# Patient Record
Sex: FEMALE | Race: BLACK OR AFRICAN AMERICAN | NOT HISPANIC OR LATINO | Employment: OTHER | URBAN - METROPOLITAN AREA
[De-identification: names, ages, dates, MRNs, and addresses within clinical notes are randomized per-mention and may not be internally consistent; named-entity substitution may affect disease eponyms.]

---

## 2023-05-10 ENCOUNTER — NURSING HOME VISIT (OUTPATIENT)
Dept: POST ACUTE CARE | Facility: EXTERNAL LOCATION | Age: 88
End: 2023-05-10
Payer: MEDICARE

## 2023-05-10 DIAGNOSIS — R53.1 WEAKNESS: ICD-10-CM

## 2023-05-10 DIAGNOSIS — I10 PRIMARY HYPERTENSION: ICD-10-CM

## 2023-05-10 DIAGNOSIS — G20.A1 PARKINSON'S DISEASE (MULTI): Primary | ICD-10-CM

## 2023-05-10 DIAGNOSIS — G31.83 LEWY BODY DEMENTIA WITH OTHER BEHAVIORAL DISTURBANCE, UNSPECIFIED DEMENTIA SEVERITY (MULTI): ICD-10-CM

## 2023-05-10 DIAGNOSIS — E78.5 HYPERLIPIDEMIA, UNSPECIFIED HYPERLIPIDEMIA TYPE: ICD-10-CM

## 2023-05-10 DIAGNOSIS — F02.818 LEWY BODY DEMENTIA WITH OTHER BEHAVIORAL DISTURBANCE, UNSPECIFIED DEMENTIA SEVERITY (MULTI): ICD-10-CM

## 2023-05-10 PROCEDURE — 99305 1ST NF CARE MODERATE MDM 35: CPT | Performed by: INTERNAL MEDICINE

## 2023-05-10 NOTE — LETTER
Patient: Ofelia Cabral  : 12/10/1934    Encounter Date: 05/10/2023    Biological/Physical:  This is a 88-year-old female who is seen today. She is recently transferred to [facility] for long-term care. Her medical history significant for Parkinson's disease, Lewy body dementia, essential tremors, hyperlipidemia, urinary incontinence, osteoarthritis of the knees, history of falls and weakness.  She lives at [facility] and was noted to have slow and steady decline in her functional level. She was also noted to have behaviors due to her dementia.  Today she is sitting in her room. She appears pleasant. She denies any pain. No health concerns per staff.  Review of systems  General-confusion present  Chest-No pain or palpitations  Respiratory-no cough  Abdomen-no pain, no nausea no vomiting no diarrhea  Extremities-no swelling  Psych-confusion present  Neurology-tremors present  Vital signs-blood pressure 147/52  Temperature 97.9  Pulse-100  Weight-117 pounds  Physical exam-General-no acute distress, pleasant, confused  Neck-no mass or JVD  Chest- CTA B/L  CVS- S1S2 RRR  Abdomen-soft, NT,BS+  Extremities-no edema  Neurology-no gross motor deficits  Hand tremors present  Assessment and plan-  Dementia-Doub early dementia  Progressive decline in functioning  Continue with Seroquel for behaviors  Parkinson's disease-  Hypertension-stable  Continue with propranolol  Hyperlipidemia -continue atorvastatin   Weakness-PT OT and speech therapy evaluation  ADL's and CCL's have been reviewed and are current as per care plan.      Electronically Signed By: Monie Clark MD   23 10:55 AM

## 2023-05-12 NOTE — PROGRESS NOTES
Biological/Physical:  This is a 88-year-old female who is seen today. She is recently transferred to [facility] for long-term care. Her medical history significant for Parkinson's disease, Lewy body dementia, essential tremors, hyperlipidemia, urinary incontinence, osteoarthritis of the knees, history of falls and weakness.  She lives at [facility] and was noted to have slow and steady decline in her functional level. She was also noted to have behaviors due to her dementia.  Today she is sitting in her room. She appears pleasant. She denies any pain. No health concerns per staff.  Review of systems  General-confusion present  Chest-No pain or palpitations  Respiratory-no cough  Abdomen-no pain, no nausea no vomiting no diarrhea  Extremities-no swelling  Psych-confusion present  Neurology-tremors present  Vital signs-blood pressure 147/52  Temperature 97.9  Pulse-100  Weight-117 pounds  Physical exam-General-no acute distress, pleasant, confused  Neck-no mass or JVD  Chest- CTA B/L  CVS- S1S2 RRR  Abdomen-soft, NT,BS+  Extremities-no edema  Neurology-no gross motor deficits  Hand tremors present  Assessment and plan-  Dementia-Doub early dementia  Progressive decline in functioning  Continue with Seroquel for behaviors  Parkinson's disease-  Hypertension-stable  Continue with propranolol  Hyperlipidemia -continue atorvastatin   Weakness-PT OT and speech therapy evaluation  ADL's and CCL's have been reviewed and are current as per care plan.

## 2023-06-14 ENCOUNTER — NURSING HOME VISIT (OUTPATIENT)
Dept: POST ACUTE CARE | Facility: EXTERNAL LOCATION | Age: 88
End: 2023-06-14
Payer: MEDICARE

## 2023-06-14 DIAGNOSIS — I10 PRIMARY HYPERTENSION: ICD-10-CM

## 2023-06-14 DIAGNOSIS — G20.A1 PARKINSON'S DISEASE (MULTI): ICD-10-CM

## 2023-06-14 DIAGNOSIS — F02.80 DEMENTIA ASSOCIATED WITH OTHER UNDERLYING DISEASE, UNSPECIFIED DEMENTIA SEVERITY, UNSPECIFIED WHETHER BEHAVIORAL, PSYCHOTIC, OR MOOD DISTURBANCE OR ANXIETY (MULTI): Primary | ICD-10-CM

## 2023-06-14 PROCEDURE — 99308 SBSQ NF CARE LOW MDM 20: CPT | Performed by: INTERNAL MEDICINE

## 2023-06-14 NOTE — LETTER
Patient: Ofelia Cabral  : 12/10/1934    Encounter Date: 2023    Subjective- resident seen for follow up. she is sitting in her room and feels well. No aches or pains.  Review of systems  General-confusion present  Chest-No pain or palpitations  Respiratory-nocough  Abdomen-no pain, no nausea no vomiting no diarrhea  Extremities-no swelling  Psych-confusion present  Neurology-tremors present  Vital signs-blood pressure 147/52  Temperature 97.2  Pulse-100  Weight-121 pounds    Physical exam-  General-no acute distress, pleasant, confused  Neck-no mass or JVD  Chest- CTA B/L  CVS- S1S2 RRR  Abdomen-soft, NT,BS+  Extremities-no edema  Neurology-no gross motor deficits  Hand tremors present    Assessment and plan-  Dementia-Early dementia  Progressive decline in functioning  Continue with Seroquel for behaviors  Parkinson's disease-  Hypertension-stable  Continue with propranolol      Electronically Signed By: Monie Clark MD   23 10:26 AM

## 2023-06-16 NOTE — PROGRESS NOTES
Subjective- resident seen for follow up. she is sitting in her room and feels well. No aches or pains.  Review of systems  General-confusion present  Chest-No pain or palpitations  Respiratory-nocough  Abdomen-no pain, no nausea no vomiting no diarrhea  Extremities-no swelling  Psych-confusion present  Neurology-tremors present  Vital signs-blood pressure 147/52  Temperature 97.2  Pulse-100  Weight-121 pounds    Physical exam-  General-no acute distress, pleasant, confused  Neck-no mass or JVD  Chest- CTA B/L  CVS- S1S2 RRR  Abdomen-soft, NT,BS+  Extremities-no edema  Neurology-no gross motor deficits  Hand tremors present    Assessment and plan-  Dementia-Early dementia  Progressive decline in functioning  Continue with Seroquel for behaviors  Parkinson's disease-  Hypertension-stable  Continue with propranolol

## 2023-07-12 ENCOUNTER — NURSING HOME VISIT (OUTPATIENT)
Dept: POST ACUTE CARE | Facility: EXTERNAL LOCATION | Age: 88
End: 2023-07-12
Payer: MEDICARE

## 2023-07-12 DIAGNOSIS — G20.A1 PARKINSON'S DISEASE (MULTI): ICD-10-CM

## 2023-07-12 DIAGNOSIS — R53.1 WEAKNESS: ICD-10-CM

## 2023-07-12 DIAGNOSIS — I10 PRIMARY HYPERTENSION: ICD-10-CM

## 2023-07-12 DIAGNOSIS — F02.80 DEMENTIA ASSOCIATED WITH OTHER UNDERLYING DISEASE, UNSPECIFIED DEMENTIA SEVERITY, UNSPECIFIED WHETHER BEHAVIORAL, PSYCHOTIC, OR MOOD DISTURBANCE OR ANXIETY (MULTI): Primary | ICD-10-CM

## 2023-07-12 PROCEDURE — 99307 SBSQ NF CARE SF MDM 10: CPT | Performed by: INTERNAL MEDICINE

## 2023-07-12 NOTE — LETTER
Patient: Ofelia Cabral  : 12/10/1934    Encounter Date: 2023    subjective- resident seen for follow up. she is lying in her room and feels well. No aches or pains.  Review of systems  General-confusion present  Chest-No pain or palpitations  Respiratory-nocough  Abdomen-no pain, no nausea no vomiting no diarrhea  Extremities-no swelling  Psych-confusion present  Neurology-tremors present  Vital signs-blood pressure 147/52  Temperature 98  Pulse-100  Weight-119 pounds  Physical exam-  General-no acute distress, pleasant, confused  Neck-no mass or JVD  Chest- CTA B/L  CVS- S1S2 RRR  Abdomen-soft, NT,BS+  Extremities-no edema  Neurology-no gross motor deficits  Hand tremors present  Assessment and plan-  Dementia-Early dementia  c/w supportive care  Continue with Seroquel for behaviors  Parkinson's disease-  Hypertension-stable  Continue with propranolol  Hyperlipidemia we will continue atorvastatin Weakness-c/w PT OT      Electronically Signed By: Monie Clark MD   23  9:30 PM

## 2023-07-13 NOTE — PROGRESS NOTES
subjective- resident seen for follow up. she is lying in her room and feels well. No aches or pains.  Review of systems  General-confusion present  Chest-No pain or palpitations  Respiratory-nocough  Abdomen-no pain, no nausea no vomiting no diarrhea  Extremities-no swelling  Psych-confusion present  Neurology-tremors present  Vital signs-blood pressure 147/52  Temperature 98  Pulse-100  Weight-119 pounds  Physical exam-  General-no acute distress, pleasant, confused  Neck-no mass or JVD  Chest- CTA B/L  CVS- S1S2 RRR  Abdomen-soft, NT,BS+  Extremities-no edema  Neurology-no gross motor deficits  Hand tremors present  Assessment and plan-  Dementia-Early dementia  c/w supportive care  Continue with Seroquel for behaviors  Parkinson's disease-  Hypertension-stable  Continue with propranolol  Hyperlipidemia we will continue atorvastatin Weakness-c/w PT OT

## 2023-08-02 ENCOUNTER — NURSING HOME VISIT (OUTPATIENT)
Dept: POST ACUTE CARE | Facility: EXTERNAL LOCATION | Age: 88
End: 2023-08-02
Payer: MEDICARE

## 2023-08-02 DIAGNOSIS — F02.80 DEMENTIA ASSOCIATED WITH OTHER UNDERLYING DISEASE, UNSPECIFIED DEMENTIA SEVERITY, UNSPECIFIED WHETHER BEHAVIORAL, PSYCHOTIC, OR MOOD DISTURBANCE OR ANXIETY (MULTI): Primary | ICD-10-CM

## 2023-08-02 DIAGNOSIS — E78.49 OTHER HYPERLIPIDEMIA: ICD-10-CM

## 2023-08-02 DIAGNOSIS — I10 PRIMARY HYPERTENSION: ICD-10-CM

## 2023-08-02 DIAGNOSIS — G20.A1 PARKINSON'S DISEASE (MULTI): ICD-10-CM

## 2023-08-02 PROBLEM — F03.90 DEMENTIA (MULTI): Status: ACTIVE | Noted: 2023-08-02

## 2023-08-02 PROCEDURE — 99308 SBSQ NF CARE LOW MDM 20: CPT | Performed by: INTERNAL MEDICINE

## 2023-08-02 NOTE — LETTER
Patient: Ofelia Cabral  : 12/10/1934    Encounter Date: 2023    subjective- resident seen for follow up. She had a fall few days back, no injury. B/L hand tremors present.  Review of systems  General-confusion present  Chest-No pain or palpitations  Respiratory-nocough  Abdomen-no pain, no nausea no vomiting no diarrhea  Extremities-no swelling  Psych-confusion present  Neurology-tremors present  Vital signs-blood pressure 147/52  Temperature 98  Pulse-100  Weight-115 pounds  Physical exam-  General-no acute distress, pleasant, confused  Neck-no mass or JVD  Chest- CTA B/L  CVS- S1S2 RRR  Abdomen-soft, NT,BS+  Extremities-no edema  Neurology-no gross motor deficits  Hand tremors present    Assessment and plan-  Dementia-Early dementia  c/w supportive care  Continue with Seroquel for behaviors  Parkinson's disease- neurology appt  Hypertension-stable  Continue with propranolol  Hyperlipidemia we will continue atorvastatin Weakness-c/w PT OT      Electronically Signed By: Monie Clark MD   23  8:26 PM

## 2023-08-03 NOTE — PROGRESS NOTES
subjective- resident seen for follow up. She had a fall few days back, no injury. B/L hand tremors present.  Review of systems  General-confusion present  Chest-No pain or palpitations  Respiratory-nocough  Abdomen-no pain, no nausea no vomiting no diarrhea  Extremities-no swelling  Psych-confusion present  Neurology-tremors present  Vital signs-blood pressure 147/52  Temperature 98  Pulse-100  Weight-115 pounds  Physical exam-  General-no acute distress, pleasant, confused  Neck-no mass or JVD  Chest- CTA B/L  CVS- S1S2 RRR  Abdomen-soft, NT,BS+  Extremities-no edema  Neurology-no gross motor deficits  Hand tremors present    Assessment and plan-  Dementia-Early dementia  c/w supportive care  Continue with Seroquel for behaviors  Parkinson's disease- neurology appt  Hypertension-stable  Continue with propranolol  Hyperlipidemia we will continue atorvastatin Weakness-c/w PT OT

## 2023-08-30 ENCOUNTER — NURSING HOME VISIT (OUTPATIENT)
Dept: POST ACUTE CARE | Facility: EXTERNAL LOCATION | Age: 88
End: 2023-08-30
Payer: MEDICARE

## 2023-08-30 DIAGNOSIS — I10 PRIMARY HYPERTENSION: Primary | ICD-10-CM

## 2023-08-30 DIAGNOSIS — G20.A1 PARKINSON'S DISEASE (MULTI): ICD-10-CM

## 2023-08-30 DIAGNOSIS — E78.49 OTHER HYPERLIPIDEMIA: ICD-10-CM

## 2023-08-30 DIAGNOSIS — R25.1 TREMOR OF BOTH HANDS: ICD-10-CM

## 2023-08-30 DIAGNOSIS — F01.A18 MILD VASCULAR DEMENTIA WITH OTHER BEHAVIORAL DISTURBANCE (MULTI): ICD-10-CM

## 2023-08-30 DIAGNOSIS — R53.1 WEAKNESS: ICD-10-CM

## 2023-08-30 PROCEDURE — 99308 SBSQ NF CARE LOW MDM 20: CPT | Performed by: INTERNAL MEDICINE

## 2023-08-30 NOTE — LETTER
Patient: Ofelia Cabral  : 12/10/1934    Encounter Date: 2023    Subjective- resident seen for follow up. She had a fall few days back, no injury. She has hand tremors present.  Review of systems  General-confusion present  Chest-No pain or palpitations  Respiratory-nocough  Abdomen-no pain, no nausea no vomiting no diarrhea  Extremities-no swelling  Psych-confusion present  Neurology-tremors present  Vital signs-blood pressure 147/52  Temperature 97.2  Pulse-100  Weight-115 pounds  Physical exam-  General-no acute distress, pleasant, confused  Neck-no mass or JVD  Chest- CTA B/L  CVS- S1S2 RRR  Abdomen-soft, NT,BS+  Extremities-no edema  Neurology-no gross motor deficits  Hand tremors present    Assessment and plan-  Dementia-Early dementia  c/w supportivecare  Continue with Seroquel for behaviors  Parkinson's disease- neurology appt  Hypertension-stable  Continue with propranolol  Hyperlipidemia we will continue atorvastatin Weakness-c/w PT OT      Electronically Signed By: Monie Clark MD   23 12:21 PM

## 2023-09-01 NOTE — PROGRESS NOTES
Subjective- resident seen for follow up. She had a fall few days back, no injury. She has hand tremors present.  Review of systems  General-confusion present  Chest-No pain or palpitations  Respiratory-nocough  Abdomen-no pain, no nausea no vomiting no diarrhea  Extremities-no swelling  Psych-confusion present  Neurology-tremors present  Vital signs-blood pressure 147/52  Temperature 97.2  Pulse-100  Weight-115 pounds  Physical exam-  General-no acute distress, pleasant, confused  Neck-no mass or JVD  Chest- CTA B/L  CVS- S1S2 RRR  Abdomen-soft, NT,BS+  Extremities-no edema  Neurology-no gross motor deficits  Hand tremors present    Assessment and plan-  Dementia-Early dementia  c/w supportivecare  Continue with Seroquel for behaviors  Parkinson's disease- neurology appt  Hypertension-stable  Continue with propranolol  Hyperlipidemia we will continue atorvastatin Weakness-c/w PT OT

## 2023-09-27 ENCOUNTER — NURSING HOME VISIT (OUTPATIENT)
Dept: POST ACUTE CARE | Facility: EXTERNAL LOCATION | Age: 88
End: 2023-09-27
Payer: MEDICARE

## 2023-09-27 DIAGNOSIS — F01.A18 MILD VASCULAR DEMENTIA WITH OTHER BEHAVIORAL DISTURBANCE (MULTI): ICD-10-CM

## 2023-09-27 DIAGNOSIS — I10 PRIMARY HYPERTENSION: Primary | ICD-10-CM

## 2023-09-27 DIAGNOSIS — G20.A1 PARKINSON'S DISEASE (MULTI): ICD-10-CM

## 2023-09-27 PROCEDURE — 99308 SBSQ NF CARE LOW MDM 20: CPT | Performed by: INTERNAL MEDICINE

## 2023-09-27 NOTE — LETTER
Patient: Ofelia Cabral  : 12/10/1934    Encounter Date: 2023    subjective- resident seen for follow up. No concern per staff.  Review of systems  General-confusion present  Chest-No pain or palpitations  Respiratory-nocough  Abdomen-no pain, no nausea no vomiting no diarrhea  Extremities-no swelling  Psych-confusion present  Neurology-tremors present  Vital signs-blood pressure 147/52  Temperature 97.5  Pulse-100  Weight-117.4 pounds  Physical exam-  General-no acute distress, pleasant, confused  Neck-no massor JVD  Chest- CTA B/L  CVS- S1S2 RRR  Abdomen-soft, NT,BS+  Extremities-no edema  Neurology-no gross motor deficits  Hand tremors present  Assessment and plan-  Dementia-Early dementia  c/w supportivecare  Continue with Seroquel for behaviors  Parkinson's disease- neurology appt pending  Hypertension-stable  Continue with propranolol  Hyperlipidemia we will continue atorvastatin Weakness-c/w PT OT      Electronically Signed By: Monie Clark MD   23  8:34 PM

## 2023-09-28 NOTE — PROGRESS NOTES
subjective- resident seen for follow up. No concern per staff.  Review of systems  General-confusion present  Chest-No pain or palpitations  Respiratory-nocough  Abdomen-no pain, no nausea no vomiting no diarrhea  Extremities-no swelling  Psych-confusion present  Neurology-tremors present  Vital signs-blood pressure 147/52  Temperature 97.5  Pulse-100  Weight-117.4 pounds  Physical exam-  General-no acute distress, pleasant, confused  Neck-no massor JVD  Chest- CTA B/L  CVS- S1S2 RRR  Abdomen-soft, NT,BS+  Extremities-no edema  Neurology-no gross motor deficits  Hand tremors present  Assessment and plan-  Dementia-Early dementia  c/w supportivecare  Continue with Seroquel for behaviors  Parkinson's disease- neurology appt pending  Hypertension-stable  Continue with propranolol  Hyperlipidemia we will continue atorvastatin Weakness-c/w PT OT

## 2023-10-27 ENCOUNTER — NURSING HOME VISIT (OUTPATIENT)
Dept: POST ACUTE CARE | Facility: EXTERNAL LOCATION | Age: 88
End: 2023-10-27
Payer: MEDICARE

## 2023-10-27 DIAGNOSIS — G20.A1 PARKINSON'S DISEASE WITHOUT DYSKINESIA OR FLUCTUATING MANIFESTATIONS (MULTI): ICD-10-CM

## 2023-10-27 DIAGNOSIS — I10 PRIMARY HYPERTENSION: Primary | ICD-10-CM

## 2023-10-27 DIAGNOSIS — F01.A18 MILD VASCULAR DEMENTIA WITH OTHER BEHAVIORAL DISTURBANCE (MULTI): ICD-10-CM

## 2023-10-27 PROCEDURE — 99308 SBSQ NF CARE LOW MDM 20: CPT | Performed by: INTERNAL MEDICINE

## 2023-10-27 NOTE — LETTER
Patient: Ofelia Cabral  : 12/10/1934    Encounter Date: 10/27/2023        subjective- resident seen for follow up.She is lying in bed. No concern per staff.  Review of systems  General-confusion present  Chest-No pain or palpitations  Respiratory-nocough  Abdomen-no pain, no nausea no vomiting no diarrhea  Extremities-no swelling  Psych-confusion present  Neurology-tremors present  Vital signs-blood pressure 147/52  Temperature 97.2  Pulse-100  Weight-123 pounds  Physical exam-  General-no acute distress, pleasant, confused  Neck-no massor JVD  Chest- CTA B/L  CVS- S1S2 RRR  Abdomen-soft, NT,BS+  Extremities-no edema  Neurology-no gross motor deficits  Hand tremors present  Assessment and plan-  Dementia-  c/w supportive care  Continue with Seroquel for behaviors  Parkinson's disease- neurology appt pending  Hypertension-stable  Continue with propranolol  Hyperlipidemia- continue atorvastatin   Weakness-c/w PT OT    Electronically Signed By: Monie Clark MD   10/29/23  8:43 AM

## 2023-10-29 NOTE — PROGRESS NOTES
subjective- resident seen for follow up.She is lying in bed. No concern per staff.  Review of systems  General-confusion present  Chest-No pain or palpitations  Respiratory-nocough  Abdomen-no pain, no nausea no vomiting no diarrhea  Extremities-no swelling  Psych-confusion present  Neurology-tremors present  Vital signs-blood pressure 147/52  Temperature 97.2  Pulse-100  Weight-123 pounds  Physical exam-  General-no acute distress, pleasant, confused  Neck-no massor JVD  Chest- CTA B/L  CVS- S1S2 RRR  Abdomen-soft, NT,BS+  Extremities-no edema  Neurology-no gross motor deficits  Hand tremors present  Assessment and plan-  Dementia-  c/w supportive care  Continue with Seroquel for behaviors  Parkinson's disease- neurology appt pending  Hypertension-stable  Continue with propranolol  Hyperlipidemia- continue atorvastatin   Weakness-c/w PT OT

## 2023-11-29 ENCOUNTER — NURSING HOME VISIT (OUTPATIENT)
Dept: POST ACUTE CARE | Facility: EXTERNAL LOCATION | Age: 88
End: 2023-11-29
Payer: MEDICARE

## 2023-11-29 DIAGNOSIS — E78.49 OTHER HYPERLIPIDEMIA: ICD-10-CM

## 2023-11-29 DIAGNOSIS — G20.A1 PARKINSON'S DISEASE WITHOUT DYSKINESIA OR FLUCTUATING MANIFESTATIONS (MULTI): ICD-10-CM

## 2023-11-29 DIAGNOSIS — I10 PRIMARY HYPERTENSION: Primary | ICD-10-CM

## 2023-11-29 DIAGNOSIS — F01.A18 MILD VASCULAR DEMENTIA WITH OTHER BEHAVIORAL DISTURBANCE (MULTI): ICD-10-CM

## 2023-11-29 PROCEDURE — 99308 SBSQ NF CARE LOW MDM 20: CPT | Performed by: INTERNAL MEDICINE

## 2023-11-29 NOTE — PROGRESS NOTES
Subjective- resident seen for follow up. She is lying in bed. No concern per staff.  Review of systems  General-confusion present  Chest-No painor palpitations  Respiratory-no cough  Abdomen-no pain, no nausea no vomiting no diarrhea  Extremities-no swelling  Psych-confusion present  Neurology-tremors present  Vital signs-blood pressure 147/52  Temperature 98.3  Pulse-100  Weight-117 pounds  Physical exam-  General-no acute distress, pleasant, confused  Neck-no mass or JVD  Chest- CTA B/L  CVS- S1S2 RRR  Abdomen-soft, NT,BS+  Extremities-no edema  Neurology-no gross motor deficits  Hand tremors present  Assessment and plan-  Dementia-  c/wsupportive care  Continue with Seroquel for behaviors  Parkinson's disease- neurology appt pending  Hypertension-stable  Continue with propranolol  Hyperlipidemia we will continue atorvastatin Weakness-c/w PT OT

## 2023-11-29 NOTE — LETTER
Patient: Ofelia Cabral  : 12/10/1934    Encounter Date: 2023    Subjective- resident seen for follow up. She is lying in bed. No concern per staff.  Review of systems  General-confusion present  Chest-No painor palpitations  Respiratory-no cough  Abdomen-no pain, no nausea no vomiting no diarrhea  Extremities-no swelling  Psych-confusion present  Neurology-tremors present  Vital signs-blood pressure 147/52  Temperature 98.3  Pulse-100  Weight-117 pounds  Physical exam-  General-no acute distress, pleasant, confused  Neck-no mass or JVD  Chest- CTA B/L  CVS- S1S2 RRR  Abdomen-soft, NT,BS+  Extremities-no edema  Neurology-no gross motor deficits  Hand tremors present  Assessment and plan-  Dementia-  c/wsupportive care  Continue with Seroquel for behaviors  Parkinson's disease- neurology appt pending  Hypertension-stable  Continue with propranolol  Hyperlipidemia we will continue atorvastatin Weakness-c/w PT OT      Electronically Signed By: Monie Clark MD   23  1:56 PM

## 2023-12-13 ENCOUNTER — NURSING HOME VISIT (OUTPATIENT)
Dept: POST ACUTE CARE | Facility: EXTERNAL LOCATION | Age: 88
End: 2023-12-13
Payer: MEDICARE

## 2023-12-13 DIAGNOSIS — I10 PRIMARY HYPERTENSION: ICD-10-CM

## 2023-12-13 DIAGNOSIS — F01.A18 MILD VASCULAR DEMENTIA WITH OTHER BEHAVIORAL DISTURBANCE (MULTI): ICD-10-CM

## 2023-12-13 DIAGNOSIS — G20.A1 PARKINSON'S DISEASE WITHOUT DYSKINESIA OR FLUCTUATING MANIFESTATIONS (MULTI): Primary | ICD-10-CM

## 2023-12-13 DIAGNOSIS — R53.1 WEAKNESS: ICD-10-CM

## 2023-12-13 PROCEDURE — 99309 SBSQ NF CARE MODERATE MDM 30: CPT | Performed by: INTERNAL MEDICINE

## 2023-12-13 NOTE — LETTER
Patient: Ofelia Cabral  : 12/10/1934    Encounter Date: 2023    Subjective- resident seen for paperwork completion for guardianship. She is lying in bed. No concern per staff.  Review of systems  General-confusion present  Chest-No painor palpitations  Respiratory-no cough  Abdomen-no pain, no nausea no vomiting no diarrhea  Extremities-no swelling  Psych-confusion present  Neurology-tremors present  Vital signs-blood pressure 147/52  Temperature 98  Pulse-100  Weight-113.9 pounds  Physical exam-  General-no acute distress, pleasant, confused  Neck-no mass or JVD  Chest- CTA B/L  CVS- S1S2 RRR  Abdomen-soft, NT,BS+  Extremities-no edema  Neurology-no gross motor deficits  Hand tremors present  Assessment and plan-  Dementia-  c/wsupportive care  Continue with Seroquel for behaviors  Parkinson's disease- neurology appt pending  Hypertension-stable  Continue with propranolol  Hyperlipidemia we will continue atorvastatin Weakness-c/w PT OT  Guardianship paperwork completed- time taken 25 min      Electronically Signed By: Monie Clark MD   23  8:04 PM

## 2023-12-14 NOTE — PROGRESS NOTES
Subjective- resident seen for paperwork completion for guardianship. She is lying in bed. No concern per staff.  Review of systems  General-confusion present  Chest-No painor palpitations  Respiratory-no cough  Abdomen-no pain, no nausea no vomiting no diarrhea  Extremities-no swelling  Psych-confusion present  Neurology-tremors present  Vital signs-blood pressure 147/52  Temperature 98  Pulse-100  Weight-113.9 pounds  Physical exam-  General-no acute distress, pleasant, confused  Neck-no mass or JVD  Chest- CTA B/L  CVS- S1S2 RRR  Abdomen-soft, NT,BS+  Extremities-no edema  Neurology-no gross motor deficits  Hand tremors present  Assessment and plan-  Dementia-  c/wsupportive care  Continue with Seroquel for behaviors  Parkinson's disease- neurology appt pending  Hypertension-stable  Continue with propranolol  Hyperlipidemia we will continue atorvastatin Weakness-c/w PT OT  Guardianship paperwork completed- time taken 25 min

## 2024-01-03 ENCOUNTER — NURSING HOME VISIT (OUTPATIENT)
Dept: POST ACUTE CARE | Facility: EXTERNAL LOCATION | Age: 89
End: 2024-01-03
Payer: MEDICARE

## 2024-01-03 DIAGNOSIS — F01.A18 MILD VASCULAR DEMENTIA WITH OTHER BEHAVIORAL DISTURBANCE (MULTI): ICD-10-CM

## 2024-01-03 DIAGNOSIS — E78.49 OTHER HYPERLIPIDEMIA: ICD-10-CM

## 2024-01-03 DIAGNOSIS — I10 PRIMARY HYPERTENSION: Primary | ICD-10-CM

## 2024-01-03 DIAGNOSIS — G20.A1 PARKINSON'S DISEASE WITHOUT DYSKINESIA OR FLUCTUATING MANIFESTATIONS (MULTI): ICD-10-CM

## 2024-01-03 PROCEDURE — 99308 SBSQ NF CARE LOW MDM 20: CPT | Performed by: INTERNAL MEDICINE

## 2024-01-03 NOTE — LETTER
Patient: Ofelia Cabral  : 12/10/1934    Encounter Date: 2024    Subjective- resident seen for follow up. She is sitting in her room . No concern per staff.  Review of systems  General-weakness+  Chest-No pain or palpitations  Respiratory-no cough  Abdomen-no pain, no nausea no vomiting no diarrhea  Extremities-no swelling  Psych-confusion present  Neurology-tremors present  Vital signs-blood pressure 147/52  Temperature 98  Pulse-100  Weight-113.9 pounds  Physical exam-  General-no acute distress, pleasant, confused  Neck-no mass or JVD  Chest- CTA B/L  CVS- S1S2 RRR  Abdomen-soft, NT,BS+  Extremities-no edema  Neurology-no gross motor deficits  Hand tremors present    Assessment and plan-  Dementia-  c/w supportive care  Continue with Seroquel for behaviors  Parkinson's disease- neurology appt pending  Hypertension-stable  Continue with propranolol  Hyperlipidemia - continue atorvastatin  Weakness-c/w PT/OT      Electronically Signed By: Monie Clark MD   24  9:08 AM

## 2024-01-04 ASSESSMENT — ENCOUNTER SYMPTOMS
LOSS OF SENSATION IN FEET: 0
OCCASIONAL FEELINGS OF UNSTEADINESS: 1
DEPRESSION: 0

## 2024-01-04 NOTE — PROGRESS NOTES
Subjective- resident seen for follow up. She is sitting in her room . No concern per staff.  Review of systems  General-weakness+  Chest-No pain or palpitations  Respiratory-no cough  Abdomen-no pain, no nausea no vomiting no diarrhea  Extremities-no swelling  Psych-confusion present  Neurology-tremors present  Vital signs-blood pressure 147/52  Temperature 98  Pulse-100  Weight-113.9 pounds  Physical exam-  General-no acute distress, pleasant, confused  Neck-no mass or JVD  Chest- CTA B/L  CVS- S1S2 RRR  Abdomen-soft, NT,BS+  Extremities-no edema  Neurology-no gross motor deficits  Hand tremors present    Assessment and plan-  Dementia-  c/w supportive care  Continue with Seroquel for behaviors  Parkinson's disease- neurology appt pending  Hypertension-stable  Continue with propranolol  Hyperlipidemia - continue atorvastatin  Weakness-c/w PT/OT

## 2024-02-07 ENCOUNTER — NURSING HOME VISIT (OUTPATIENT)
Dept: POST ACUTE CARE | Facility: EXTERNAL LOCATION | Age: 89
End: 2024-02-07
Payer: MEDICARE

## 2024-02-07 DIAGNOSIS — E78.49 OTHER HYPERLIPIDEMIA: ICD-10-CM

## 2024-02-07 DIAGNOSIS — F01.A18 MILD VASCULAR DEMENTIA WITH OTHER BEHAVIORAL DISTURBANCE (MULTI): ICD-10-CM

## 2024-02-07 DIAGNOSIS — G20.A1 PARKINSON'S DISEASE WITHOUT DYSKINESIA OR FLUCTUATING MANIFESTATIONS (MULTI): Primary | ICD-10-CM

## 2024-02-07 DIAGNOSIS — I10 PRIMARY HYPERTENSION: ICD-10-CM

## 2024-02-07 PROCEDURE — 99308 SBSQ NF CARE LOW MDM 20: CPT | Performed by: INTERNAL MEDICINE

## 2024-02-07 NOTE — LETTER
Patient: Ofelia Cabral  : 12/10/1934    Encounter Date: 2024    Subjective- resident seen for follow up. She is sitting in her room . No concern per staff.  Review of systems  General-weakness+  Chest-No painor palpitations  Respiratory-no cough  Abdomen-no pain, no nausea no vomiting no diarrhea  Extremities-no swelling  Psych-confusion present  Neurology-tremors present  Vital signs-blood pressure 147/52  Temperature 98  Pulse-100  Weight-114.7 pounds  Physical exam-  General-no acute distress, pleasant, confused  Neck-no mass or JVD  Chest- CTA B/L  CVS- S1S2 RRR  Abdomen-soft, NT,BS+  Extremities-no edema  Neurology-no gross motor deficits  Hand tremors present  Assessment and plan-  Dementia-  c/w supportive care  Continue with Seroquel for behaviors  Parkinson's disease- neurology appt   Hypertension-stable  Continue with propranolol  Hyperlipidemia - continue atorvastatin  Weakness-c/w PT/OT      Electronically Signed By: Monie Clark MD   24  2:36 PM

## 2024-02-08 NOTE — PROGRESS NOTES
Subjective- resident seen for follow up. She is sitting in her room . No concern per staff.  Review of systems  General-weakness+  Chest-No painor palpitations  Respiratory-no cough  Abdomen-no pain, no nausea no vomiting no diarrhea  Extremities-no swelling  Psych-confusion present  Neurology-tremors present  Vital signs-blood pressure 147/52  Temperature 98  Pulse-100  Weight-114.7 pounds  Physical exam-  General-no acute distress, pleasant, confused  Neck-no mass or JVD  Chest- CTA B/L  CVS- S1S2 RRR  Abdomen-soft, NT,BS+  Extremities-no edema  Neurology-no gross motor deficits  Hand tremors present  Assessment and plan-  Dementia-  c/w supportive care  Continue with Seroquel for behaviors  Parkinson's disease- neurology appt 06/24  Hypertension-stable  Continue with propranolol  Hyperlipidemia - continue atorvastatin  Weakness-c/w PT/OT

## 2024-03-06 ENCOUNTER — NURSING HOME VISIT (OUTPATIENT)
Dept: POST ACUTE CARE | Facility: EXTERNAL LOCATION | Age: 89
End: 2024-03-06
Payer: MEDICARE

## 2024-03-06 DIAGNOSIS — I10 PRIMARY HYPERTENSION: ICD-10-CM

## 2024-03-06 DIAGNOSIS — G20.A1 PARKINSON'S DISEASE WITHOUT DYSKINESIA OR FLUCTUATING MANIFESTATIONS (MULTI): Primary | ICD-10-CM

## 2024-03-06 DIAGNOSIS — R53.1 WEAKNESS: ICD-10-CM

## 2024-03-06 DIAGNOSIS — F01.A18 MILD VASCULAR DEMENTIA WITH OTHER BEHAVIORAL DISTURBANCE (MULTI): ICD-10-CM

## 2024-03-06 PROCEDURE — 99308 SBSQ NF CARE LOW MDM 20: CPT | Performed by: INTERNAL MEDICINE

## 2024-03-06 NOTE — LETTER
Patient: Ofelia Cabral  : 12/10/1934    Encounter Date: 2024    Subjective- resident seen for follow up. She is sitting in her room . She c/o tremors in hands.  Review of systems  General-weakness+  Chest-No pain or palpitations  Respiratory-no cough  Abdomen-no pain, no nausea no vomiting no diarrhea  Extremities-no swelling  Psych-confusion present  Neurology-tremors present  Vital signs-blood pressure 147/52  Temperature 97.1  Pulse-100  Weight-114.7 pounds  Physical exam-  General-no acute distress, pleasant, confused  Neck-no mass or JVD  Chest- CTA B/L  CVS- S1S2 RRR  Abdomen-soft, NT,BS+  Extremities-no edema  Neurology-no gross motor deficits  Hand tremors present    Assessment and plan-  Dementia-  c/w supportive care  Continue with Seroquel for behaviors  Parkinson's disease- neurology appt   Hypertension-stable  Continue with propranolol  Hyperlipidemia - continue atorvastatin  Weakness-c/w PT/OT      Electronically Signed By: Monie Clark MD   3/7/24  8:54 AM

## 2024-03-07 NOTE — PROGRESS NOTES
Subjective- resident seen for follow up. She is sitting in her room . She c/o tremors in hands.  Review of systems  General-weakness+  Chest-No pain or palpitations  Respiratory-no cough  Abdomen-no pain, no nausea no vomiting no diarrhea  Extremities-no swelling  Psych-confusion present  Neurology-tremors present  Vital signs-blood pressure 147/52  Temperature 97.1  Pulse-100  Weight-114.7 pounds  Physical exam-  General-no acute distress, pleasant, confused  Neck-no mass or JVD  Chest- CTA B/L  CVS- S1S2 RRR  Abdomen-soft, NT,BS+  Extremities-no edema  Neurology-no gross motor deficits  Hand tremors present    Assessment and plan-  Dementia-  c/w supportive care  Continue with Seroquel for behaviors  Parkinson's disease- neurology appt 06/24  Hypertension-stable  Continue with propranolol  Hyperlipidemia - continue atorvastatin  Weakness-c/w PT/OT

## 2024-04-03 ENCOUNTER — NURSING HOME VISIT (OUTPATIENT)
Dept: POST ACUTE CARE | Facility: EXTERNAL LOCATION | Age: 89
End: 2024-04-03
Payer: MEDICARE

## 2024-04-03 DIAGNOSIS — G20.A1 PARKINSON'S DISEASE WITHOUT DYSKINESIA OR FLUCTUATING MANIFESTATIONS (MULTI): Primary | ICD-10-CM

## 2024-04-03 DIAGNOSIS — R53.1 WEAKNESS: ICD-10-CM

## 2024-04-03 DIAGNOSIS — F01.A18 MILD VASCULAR DEMENTIA WITH OTHER BEHAVIORAL DISTURBANCE (MULTI): ICD-10-CM

## 2024-04-03 DIAGNOSIS — I10 PRIMARY HYPERTENSION: ICD-10-CM

## 2024-04-03 PROCEDURE — 99308 SBSQ NF CARE LOW MDM 20: CPT | Performed by: INTERNAL MEDICINE

## 2024-04-03 NOTE — LETTER
Patient: Ofelia Cabral  : 12/10/1934    Encounter Date: 2024    Subjective- resident seen for follow up. She is sitting in her room. She has tremors in hands.  Review of systems-  General-weakness+  Chest-No pain or palpitations  Respiratory-no cough  Abdomen-no pain, no nausea no vomiting no diarrhea  Extremities-no swelling  Psych-confusion present  Neurology-tremors present  Vital signs-blood pressure 147/52  Temperature 98.1  Pulse-100  Weight-113.6 pounds  Physical exam-  General-no acute distress, pleasant, confused  Neck-no mass or JVD  Chest- CTA B/L  CVS- S1S2 RRR  Abdomen-soft, NT,BS+  Extremities-no edema  Neurology-no gross motor deficits  Hand tremors present  Assessment and plan-  Dementia-  c/w supportive care  Continue with Seroquel for behaviors  Parkinson's disease- neurology appt   Hypertension-stable  Continue with propranolol  Hyperlipidemia - continue atorvastatin  Weakness-c/w PT/OT      Electronically Signed By: Monie Clark MD   24 10:44 AM

## 2024-04-04 NOTE — PROGRESS NOTES
Subjective- resident seen for follow up. She is sitting in her room. She has tremors in hands.  Review of systems-  General-weakness+  Chest-No pain or palpitations  Respiratory-no cough  Abdomen-no pain, no nausea no vomiting no diarrhea  Extremities-no swelling  Psych-confusion present  Neurology-tremors present  Vital signs-blood pressure 147/52  Temperature 98.1  Pulse-100  Weight-113.6 pounds  Physical exam-  General-no acute distress, pleasant, confused  Neck-no mass or JVD  Chest- CTA B/L  CVS- S1S2 RRR  Abdomen-soft, NT,BS+  Extremities-no edema  Neurology-no gross motor deficits  Hand tremors present  Assessment and plan-  Dementia-  c/w supportive care  Continue with Seroquel for behaviors  Parkinson's disease- neurology appt 06/24  Hypertension-stable  Continue with propranolol  Hyperlipidemia - continue atorvastatin  Weakness-c/w PT/OT

## 2024-04-17 ENCOUNTER — NURSING HOME VISIT (OUTPATIENT)
Dept: POST ACUTE CARE | Facility: EXTERNAL LOCATION | Age: 89
End: 2024-04-17
Payer: MEDICARE

## 2024-04-17 DIAGNOSIS — I10 PRIMARY HYPERTENSION: ICD-10-CM

## 2024-04-17 DIAGNOSIS — H61.23 CERUMEN DEBRIS ON TYMPANIC MEMBRANE OF BOTH EARS: ICD-10-CM

## 2024-04-17 DIAGNOSIS — G20.A1 PARKINSON'S DISEASE WITHOUT DYSKINESIA OR FLUCTUATING MANIFESTATIONS (MULTI): Primary | ICD-10-CM

## 2024-04-17 DIAGNOSIS — F01.A18 MILD VASCULAR DEMENTIA WITH OTHER BEHAVIORAL DISTURBANCE (MULTI): ICD-10-CM

## 2024-04-17 PROCEDURE — 99308 SBSQ NF CARE LOW MDM 20: CPT | Performed by: INTERNAL MEDICINE

## 2024-04-17 NOTE — LETTER
Patient: Ofelia Cabral  : 12/10/1934    Encounter Date: 2024    Subjective- resident seen for ear concerns per dtr. She would like her ears checked for possible blockag/ear wax.  Review of systems-  General-weakness+  Chest-No pain or palpitations  Respiratory-no cough  Abdomen-no pain, no nausea no vomiting no diarrhea  Extremities-no swelling  Psych-confusion present  Neurology-tremors present  Vital signs-blood pressure 147/52  Temperature 98.1  Pulse-100  Weight-112.3 pounds  Physical exam-  General-no acute distress, pleasant, confused  Ears- Cerumen B/L  Neck-no mass or JVD  Chest- CTA B/L  CVS- S1S2 RRR  Abdomen-soft, NT,BS+  Extremities-no edema  Neurology-no gross motor deficits  Hand tremors present  Assessment and plan-  Cerumen b/l ears- debrox ear drops  Dementia-  c/w supportive care  Continue with Seroquel for behaviors  Parkinson's disease- neurology appt   Hypertension-stable  Continue with propranolol  Hyperlipidemia - continue atorvastatin  Weakness-c/w PT/OT      Electronically Signed By: Monie Clark MD   24  8:54 AM

## 2024-04-19 NOTE — PROGRESS NOTES
Subjective- resident seen for ear concerns per dtr. She would like her ears checked for possible blockag/ear wax.  Review of systems-  General-weakness+  Chest-No pain or palpitations  Respiratory-no cough  Abdomen-no pain, no nausea no vomiting no diarrhea  Extremities-no swelling  Psych-confusion present  Neurology-tremors present  Vital signs-blood pressure 147/52  Temperature 98.1  Pulse-100  Weight-112.3 pounds  Physical exam-  General-no acute distress, pleasant, confused  Ears- Cerumen B/L  Neck-no mass or JVD  Chest- CTA B/L  CVS- S1S2 RRR  Abdomen-soft, NT,BS+  Extremities-no edema  Neurology-no gross motor deficits  Hand tremors present  Assessment and plan-  Cerumen b/l ears- debrox ear drops  Dementia-  c/w supportive care  Continue with Seroquel for behaviors  Parkinson's disease- neurology appt 06/24  Hypertension-stable  Continue with propranolol  Hyperlipidemia - continue atorvastatin  Weakness-c/w PT/OT

## 2024-05-08 ENCOUNTER — NURSING HOME VISIT (OUTPATIENT)
Dept: POST ACUTE CARE | Facility: EXTERNAL LOCATION | Age: 89
End: 2024-05-08
Payer: MEDICARE

## 2024-05-08 DIAGNOSIS — G20.A1 PARKINSON'S DISEASE WITHOUT DYSKINESIA OR FLUCTUATING MANIFESTATIONS (MULTI): Primary | ICD-10-CM

## 2024-05-08 DIAGNOSIS — F01.A18 MILD VASCULAR DEMENTIA WITH OTHER BEHAVIORAL DISTURBANCE (MULTI): ICD-10-CM

## 2024-05-08 DIAGNOSIS — I10 PRIMARY HYPERTENSION: ICD-10-CM

## 2024-05-08 DIAGNOSIS — R53.1 WEAKNESS: ICD-10-CM

## 2024-05-08 PROCEDURE — 99308 SBSQ NF CARE LOW MDM 20: CPT | Performed by: INTERNAL MEDICINE

## 2024-05-08 NOTE — PROGRESS NOTES
subjective- resident seen for follow up. She is lying in bed.  Review of systems-  General-weakness+  Chest-No pain or palpitations  Respiratory-no cough  Abdomen-no pain, no nausea no vomiting no diarrhea  Extremities-no swelling  Psych-confusion present  Neurology-tremors present  Vital signs-blood pressure 147/52  Temperature 97.5  Pulse-100  Weight-108.8 pounds  Physical exam-  General-noacute distress, pleasant, confused  Neck-no mass or JVD  Chest- CTA B/L  CVS- S1S2 RRR  Abdomen-soft, NT,BS+  Extremities-no edema  Neurology-no gross motor deficits  Hand tremors present  Assessment and plan-  Dementia-  c/w supportive care  Continue with Seroquel for behaviors  Parkinson's disease- neurology appt 06/24  Hypertension-stable  Continue with propranolol  Hyperlipidemia - continue atorvastatin  Weakness-c/w PT/OT

## 2024-05-08 NOTE — LETTER
Patient: Ofelia Cabral  : 12/10/1934    Encounter Date: 2024    subjective- resident seen for follow up. She is lying in bed.  Review of systems-  General-weakness+  Chest-No pain or palpitations  Respiratory-no cough  Abdomen-no pain, no nausea no vomiting no diarrhea  Extremities-no swelling  Psych-confusion present  Neurology-tremors present  Vital signs-blood pressure 147/52  Temperature 97.5  Pulse-100  Weight-108.8 pounds  Physical exam-  General-noacute distress, pleasant, confused  Neck-no mass or JVD  Chest- CTA B/L  CVS- S1S2 RRR  Abdomen-soft, NT,BS+  Extremities-no edema  Neurology-no gross motor deficits  Hand tremors present  Assessment and plan-  Dementia-  c/w supportive care  Continue with Seroquel for behaviors  Parkinson's disease- neurology appt   Hypertension-stable  Continue with propranolol  Hyperlipidemia - continue atorvastatin  Weakness-c/w PT/OT      Electronically Signed By: Monie Clark MD   24  4:37 PM

## 2024-06-05 ENCOUNTER — OFFICE VISIT (OUTPATIENT)
Dept: NEUROLOGY | Facility: CLINIC | Age: 89
End: 2024-06-05
Payer: MEDICARE

## 2024-06-05 ENCOUNTER — NURSING HOME VISIT (OUTPATIENT)
Dept: POST ACUTE CARE | Facility: EXTERNAL LOCATION | Age: 89
End: 2024-06-05
Payer: MEDICARE

## 2024-06-05 VITALS — RESPIRATION RATE: 18 BRPM | HEART RATE: 76 BPM | SYSTOLIC BLOOD PRESSURE: 101 MMHG | DIASTOLIC BLOOD PRESSURE: 54 MMHG

## 2024-06-05 DIAGNOSIS — G20.A1 PARKINSON'S DISEASE WITHOUT DYSKINESIA OR FLUCTUATING MANIFESTATIONS (MULTI): Primary | ICD-10-CM

## 2024-06-05 DIAGNOSIS — I10 PRIMARY HYPERTENSION: ICD-10-CM

## 2024-06-05 DIAGNOSIS — F01.A18 MILD VASCULAR DEMENTIA WITH OTHER BEHAVIORAL DISTURBANCE (MULTI): ICD-10-CM

## 2024-06-05 DIAGNOSIS — R53.1 WEAKNESS: ICD-10-CM

## 2024-06-05 PROCEDURE — 3074F SYST BP LT 130 MM HG: CPT | Performed by: STUDENT IN AN ORGANIZED HEALTH CARE EDUCATION/TRAINING PROGRAM

## 2024-06-05 PROCEDURE — 3078F DIAST BP <80 MM HG: CPT | Performed by: STUDENT IN AN ORGANIZED HEALTH CARE EDUCATION/TRAINING PROGRAM

## 2024-06-05 PROCEDURE — 99205 OFFICE O/P NEW HI 60 MIN: CPT | Performed by: STUDENT IN AN ORGANIZED HEALTH CARE EDUCATION/TRAINING PROGRAM

## 2024-06-05 PROCEDURE — 1159F MED LIST DOCD IN RCRD: CPT | Performed by: STUDENT IN AN ORGANIZED HEALTH CARE EDUCATION/TRAINING PROGRAM

## 2024-06-05 PROCEDURE — 99308 SBSQ NF CARE LOW MDM 20: CPT | Performed by: INTERNAL MEDICINE

## 2024-06-05 RX ORDER — CARBIDOPA AND LEVODOPA 25; 100 MG/1; MG/1
TABLET ORAL
Qty: 120 TABLET | Refills: 3 | Status: SHIPPED | OUTPATIENT
Start: 2024-06-05

## 2024-06-05 RX ORDER — ATORVASTATIN CALCIUM 10 MG/1
TABLET, FILM COATED ORAL
COMMUNITY
Start: 2024-05-29

## 2024-06-05 RX ORDER — QUETIAPINE FUMARATE 25 MG/1
TABLET, FILM COATED ORAL
COMMUNITY
Start: 2024-04-14

## 2024-06-05 RX ORDER — PROPRANOLOL HYDROCHLORIDE 80 MG/1
CAPSULE, EXTENDED RELEASE ORAL
COMMUNITY
Start: 2024-05-29

## 2024-06-05 ASSESSMENT — UNIFIED PARKINSONS DISEASE RATING SCALE (UPDRS)
DYSKINESIAS_PRESENT: NO
AMPLITUDE_LUE: 3
FACIAL_EXPRESSION: 3
KINETIC_TREMOR_LEFTHAND: 1
AMPLITUDE_RUE: 3
LEG_AGILITY_LEFT: 3
PRONATION_SUPINATION_RIGHT: 1
HANDMOVEMENTS_RIGHT: 1
CHAIR_RISING_SCALE: 2
RIGIDITY_LUE: 2
POSTURAL_TREMOR_LEFTHAND: 0
AMPLITUDE_LIP_JAW: 2
FINGER_TAPPING_RIGHT: 2
KINETIC_TREMOR_RIGHTHAND: 1
AMPLITUDE_LLE: 0
FINGER_TAPPING_LEFT: 2
LEG_AGILITY_RIGHT: 3
POSTURE: 3
TOETAPPING_RIGHT: 3
AMPLITUDE_RLE: 0
SPEECH: 2
RIGIDITY_LLE: 2
LEVODOPA: NO
RIGIDITY_RUE: 2
PRONATION_SUPINATION_LEFT: 1
FREEZING_GAIT: 0
RIGIDITY_RLE: 2
CONSTANCY_TREMOR_ATREST: 3
TOETAPPING_LEFT: 3
GAIT: 3
POSTURAL_STABILITY: 3
PARKINSONS_MEDS: NO
POSTURAL_TREMOR_RIGHTHAND: 0

## 2024-06-05 ASSESSMENT — VISUAL ACUITY: VA_NORMAL: 1

## 2024-06-05 ASSESSMENT — PATIENT HEALTH QUESTIONNAIRE - PHQ9
SUM OF ALL RESPONSES TO PHQ9 QUESTIONS 1 AND 2: 0
1. LITTLE INTEREST OR PLEASURE IN DOING THINGS: NOT AT ALL
2. FEELING DOWN, DEPRESSED OR HOPELESS: NOT AT ALL

## 2024-06-05 ASSESSMENT — ENCOUNTER SYMPTOMS
DEPRESSION: 0
OCCASIONAL FEELINGS OF UNSTEADINESS: 1
LOSS OF SENSATION IN FEET: 0

## 2024-06-05 NOTE — PATIENT INSTRUCTIONS
Start Carbidopa Levodopa take half tablet three times a day for three weeks then take one tablet three times a day  Follow up in 3 months with BERNADETTE Larios

## 2024-06-05 NOTE — PROGRESS NOTES
Subjective     Ofelia Cabral is a   89 y.o. year old female who presents with Evaluation of termor.  Visit type: new patient visit     HPI  Patient is here with her daughter,they don`t know why they are here.  We called the nurse in the facility and I talked with her.  Nurse told that is referred by Dr in facility [facility] of tremor and dementia.  Per review she has Hx of PD for 10 years and started with tremor and was put on C/L and then developed hallucination and dementia and stopped the medication the started Quetiapine but recently stopped that.  Her daughter says that currently is the best state of her and is not willing to start medication.Patient also is not willing ro stat medication.  Patient ambulates with wheelchair.has tremor.denies VH and has forgetfulness.No urinary issues and endorses dizziness when getting up the chair fast.    Patient Health Questionnaire-2 Score: 0          Review of Systems  All other system have been reviewed and are negative for complaint.  Objective   Neurological Exam  Mental Status   Oriented to person, place and time.    Cranial Nerves  CN II: Visual acuity is normal.  CN III, IV, VI: Extraocular movements intact bilaterally.  CN V: Facial sensation is normal.  CN VII: Full and symmetric facial movement.  CN VIII: Hearing is normal.  CN IX, X: Palate elevates symmetrically  CN XI: Shoulder shrug strength is normal.  CN XII: Tongue midline without atrophy or fasciculations.    Motor   Increased muscle tone. The following abnormal movements were seen: Strength is 5/5 throughout all four extremities.  There is moderate bradykinesia in finger tapping and toe tapping and leg agility.    Sensory  Light touch is normal in upper and lower extremities.     Reflexes                                            Right                      Left  Biceps                                 2+                         2+  Patellar                                2+                          2+  Achilles                                2+                         2+  Right Plantar: downgoing  Left Plantar: downgoing    Gait  Casual gait: Shuffling gait.      Physical Exam  Eyes:      Extraocular Movements: Extraocular movements intact.   Neurological:      Motor: Motor strength is normal.     Deep Tendon Reflexes:      Reflex Scores:       Bicep reflexes are 2+ on the right side and 2+ on the left side.       Patellar reflexes are 2+ on the right side and 2+ on the left side.       Achilles reflexes are 2+ on the right side and 2+ on the left side.            Motor Examination     Is the patient on medication for treating the symptoms of Parkinson's Disease? No   Is the patient on Levodopa? No   Speech 2   Facial Expression 3   Rigidty Neck --   Rigidty RUE 2   Rigidity - LUE 2   Rigidity RLE 2   Rigidity LLE 2   Finger Tapping Right Hand 2   Finger Tapping Left Hand 2   Hand Movements- Right Hand 1   Hand Movements- Left Hand 1   Pronatiaon-Supination Movments - Right Hand 1   Pronatiaon-Supination Movments Left Hand 1   Toe Tapping Right Foot 3   Toe Tapping - Left Foot 3   Leg Agility - Right Leg 3   Leg Agility - Left leg 3   Arising from Chair 2   Gait 3   Freezing of Gait 0   Postural Stability 3   Posture 3   Global Spontanteity of Movment ( Body Bradykinesia) --   Postural Tremor - Right Hand 0   Postural Tremor - Left hand 0   Kinetic Tremor - Right hand 1   Kinetic Tremor - Left hand 1   Rest Tremor Amplitude - RUE 3   Rest Tremor Amplitude - LUE 3   Rest Tremor Amplitude - RLE 0   Rest Tremor Amplitude - LLE 0   Rest Tremor Amplitude - Lip/Jaw 2   Constancy of Rest Tremor 3   MDS UPDRS Total Score --   Were dyskinesias (chorea or dystonia) present during examination? No   Hoen and Yahr Stage --                                   Assessment/Plan   Ofelia Cabral is a   89 y.o. year old female who presents with Evaluation of tremors started 10 years ago,was put on C/L and then was stopped bcz of  hallucination and then started Quetiapine and then was discontinued.Exam is remarkable for severe bilateral  hand rest termor and moderate rigidity and bradykinesia and shuffling gait.SX are suggestive of PDD vs Dementia with Lewy body.therefore we start C/L half tablet TID for 3 weeks (To decrease the probability SE of VH)then take one tablet TID.

## 2024-06-05 NOTE — LETTER
Patient: Ofelia Cabral  : 12/10/1934    Encounter Date: 2024    Subjective- resident seen for follow up. She is sitting in , neurology appt today.  Review of systems-  General-weakness+  Chest-No pain or palpitations  Respiratory-no cough  Abdomen-no pain, no nausea no vomiting no diarrhea  Extremities-no swelling  Psych-confusion present  Neurology-tremors present  Vital signs-blood pressure 147/52  Temperature 97.5  Pulse-100  Weight-108.8 pounds  Physical exam-  General-noacute distress, pleasant, confused  Neck-no mass or JVD  Chest- CTA B/L  CVS- S1S2 RRR  Abdomen-soft, NT,BS+  Extremities-no edema  Neurology-no gross motor deficits  Hand tremors present  Assessment and plan-  Dementia-  c/w supportive care  Continue with Seroquel for behaviors  Parkinson's disease- start carbidopa/levodopa per neurology  Hypertension-stable  Continue with propranolol  Hyperlipidemia - continue atorvastatin  Weakness-c/w PT/OT      Electronically Signed By: Monie Clark MD   24  7:25 PM

## 2024-06-05 NOTE — PROGRESS NOTES
Subjective- resident seen for follow up. She is sitting in , neurology appt today.  Review of systems-  General-weakness+  Chest-No pain or palpitations  Respiratory-no cough  Abdomen-no pain, no nausea no vomiting no diarrhea  Extremities-no swelling  Psych-confusion present  Neurology-tremors present  Vital signs-blood pressure 147/52  Temperature 97.5  Pulse-100  Weight-108.8 pounds  Physical exam-  General-noacute distress, pleasant, confused  Neck-no mass or JVD  Chest- CTA B/L  CVS- S1S2 RRR  Abdomen-soft, NT,BS+  Extremities-no edema  Neurology-no gross motor deficits  Hand tremors present  Assessment and plan-  Dementia-  c/w supportive care  Continue with Seroquel for behaviors  Parkinson's disease- start carbidopa/levodopa per neurology  Hypertension-stable  Continue with propranolol  Hyperlipidemia - continue atorvastatin  Weakness-c/w PT/OT

## 2024-06-12 ENCOUNTER — NURSING HOME VISIT (OUTPATIENT)
Dept: POST ACUTE CARE | Facility: EXTERNAL LOCATION | Age: 89
End: 2024-06-12
Payer: MEDICARE

## 2024-06-12 DIAGNOSIS — G20.A1 PARKINSON'S DISEASE WITHOUT DYSKINESIA OR FLUCTUATING MANIFESTATIONS (MULTI): ICD-10-CM

## 2024-06-12 DIAGNOSIS — R53.1 WEAKNESS: ICD-10-CM

## 2024-06-12 DIAGNOSIS — F01.A18 MILD VASCULAR DEMENTIA WITH OTHER BEHAVIORAL DISTURBANCE (MULTI): ICD-10-CM

## 2024-06-12 DIAGNOSIS — I10 PRIMARY HYPERTENSION: Primary | ICD-10-CM

## 2024-06-12 PROCEDURE — 99308 SBSQ NF CARE LOW MDM 20: CPT | Performed by: INTERNAL MEDICINE

## 2024-06-12 NOTE — LETTER
Patient: Ofelia Cabral  : 12/10/1934    Encounter Date: 2024    Subjective- resident seen for follow up. She is sitting in her room. Hand tremors present.  Review of systems-  General-weakness+  Chest-No painor palpitations  Respiratory-no cough  Abdomen-no pain, no nausea no vomiting no diarrhea  Extremities-no swelling  Psych-confusion present  Neurology-tremors present  Vital signs-blood pressure 147/52  Temperature 98  Pulse-100  Weight-108.5 pounds  Physical exam-  General-no acute distress, pleasant, confused  Neck-no mass or JVD  Chest- CTA B/L  CVS- S1S2 RRR  Abdomen-soft, NT,BS+  Extremities-no edema  Neurology-no gross motor deficits  Resting hand tremors present  Assessment and plan-  Dementia-  c/w supportive care  Continue with Seroquel for behaviors  Parkinson's disease- recently started carbidopa/levodopa  Hypertension-stable  Continue with propranolol  Hyperlipidemia - continue atorvastatin  Weakness-c/w supportive care      Electronically Signed By: Monie Clark MD   24  6:26 PM

## 2024-06-12 NOTE — PROGRESS NOTES
Subjective- resident seen for follow up. She is sitting in her room. Hand tremors present.  Review of systems-  General-weakness+  Chest-No painor palpitations  Respiratory-no cough  Abdomen-no pain, no nausea no vomiting no diarrhea  Extremities-no swelling  Psych-confusion present  Neurology-tremors present  Vital signs-blood pressure 147/52  Temperature 98  Pulse-100  Weight-108.5 pounds  Physical exam-  General-no acute distress, pleasant, confused  Neck-no mass or JVD  Chest- CTA B/L  CVS- S1S2 RRR  Abdomen-soft, NT,BS+  Extremities-no edema  Neurology-no gross motor deficits  Resting hand tremors present  Assessment and plan-  Dementia-  c/w supportive care  Continue with Seroquel for behaviors  Parkinson's disease- recently started carbidopa/levodopa  Hypertension-stable  Continue with propranolol  Hyperlipidemia - continue atorvastatin  Weakness-c/w supportive care

## 2024-07-03 ENCOUNTER — NURSING HOME VISIT (OUTPATIENT)
Dept: POST ACUTE CARE | Facility: EXTERNAL LOCATION | Age: 89
End: 2024-07-03
Payer: MEDICARE

## 2024-07-03 DIAGNOSIS — I10 PRIMARY HYPERTENSION: Primary | ICD-10-CM

## 2024-07-03 DIAGNOSIS — G20.A1 PARKINSON'S DISEASE WITHOUT DYSKINESIA OR FLUCTUATING MANIFESTATIONS (MULTI): ICD-10-CM

## 2024-07-03 DIAGNOSIS — R53.1 WEAKNESS: ICD-10-CM

## 2024-07-03 DIAGNOSIS — F01.A18 MILD VASCULAR DEMENTIA WITH OTHER BEHAVIORAL DISTURBANCE (MULTI): ICD-10-CM

## 2024-07-03 NOTE — LETTER
Patient: Ofelia Cabral  : 12/10/1934    Encounter Date: 2024    Subjective- resident seen for follow up. She is sitting in her room. Tremors improving.  Review of systems-  General-weakness+  Chest-No painor palpitations  Respiratory-no cough  Abdomen-no pain, no nausea no vomiting no diarrhea  Extremities-no swelling  Psych-confusion present  Neurology-tremors present  Vital signs-blood pressure 147/52  Temperature 97.9  Pulse-100  Weight-108.5 pounds  Physical exam-  General-no acute distress, pleasant, confused  Neck-no mass or JVD  Chest- CTA B/L  CVS- S1S2 RRR  Abdomen-soft, NT,BS+  Extremities-no edema  Neurology-no gross motor deficits  Resting hand tremors present  Assessment and plan-  Dementia-  c/w supportive care  Continue with Seroquel for behaviors  Parkinson's disease- recently started carbidopa/levodopa  Hypertension-stable  Continue with propranolol  Hyperlipidemia - continue atorvastatin  Weakness-c/w supportive care      Electronically Signed By: Monie Clark MD   7/3/24  4:20 PM

## 2024-07-03 NOTE — PROGRESS NOTES
Subjective- resident seen for follow up. She is sitting in her room. Tremors improving.  Review of systems-  General-weakness+  Chest-No painor palpitations  Respiratory-no cough  Abdomen-no pain, no nausea no vomiting no diarrhea  Extremities-no swelling  Psych-confusion present  Neurology-tremors present  Vital signs-blood pressure 147/52  Temperature 97.9  Pulse-100  Weight-108.5 pounds  Physical exam-  General-no acute distress, pleasant, confused  Neck-no mass or JVD  Chest- CTA B/L  CVS- S1S2 RRR  Abdomen-soft, NT,BS+  Extremities-no edema  Neurology-no gross motor deficits  Resting hand tremors present  Assessment and plan-  Dementia-  c/w supportive care  Continue with Seroquel for behaviors  Parkinson's disease- recently started carbidopa/levodopa  Hypertension-stable  Continue with propranolol  Hyperlipidemia - continue atorvastatin  Weakness-c/w supportive care

## 2024-08-07 ENCOUNTER — NURSING HOME VISIT (OUTPATIENT)
Dept: POST ACUTE CARE | Facility: EXTERNAL LOCATION | Age: 89
End: 2024-08-07
Payer: COMMERCIAL

## 2024-08-07 DIAGNOSIS — F01.A18 MILD VASCULAR DEMENTIA WITH OTHER BEHAVIORAL DISTURBANCE (MULTI): ICD-10-CM

## 2024-08-07 DIAGNOSIS — R53.1 WEAKNESS: ICD-10-CM

## 2024-08-07 DIAGNOSIS — I10 PRIMARY HYPERTENSION: Primary | ICD-10-CM

## 2024-08-07 DIAGNOSIS — G20.A1 PARKINSON'S DISEASE WITHOUT DYSKINESIA OR FLUCTUATING MANIFESTATIONS (MULTI): ICD-10-CM

## 2024-08-07 NOTE — LETTER
Patient: Ofelia Cabral  : 12/10/1934    Encounter Date: 2024    Subjective- resident seen for follow up. She is sitting in her room. Tremors present  Review of systems-  General-weakness+  Chest-No pain or palpitations  Respiratory-no cough  Abdomen-no pain, no nausea no vomiting no diarrhea  Extremities-no swelling  Psych-confusion present  Neurology-tremors present  Vital signs-blood pressure 147/52  Temperature 97.9  Pulse-100  Weight-109.6 pounds  Physical exam-  General-no acute distress, pleasant, confused  Neck-no mass or JVD  Chest- CTA B/L  CVS- S1S2 RRR  Abdomen-soft, NT,BS+  Extremities-no edema  Neurology-no gross motor deficits  Resting hand tremors present  Assessment and plan-  Dementia-  c/w supportive care  Continue with Seroquel for behaviors  Parkinson's disease- c/w carbidopa/levodopa  Neurology follow up  Hypertension-stable  Continue with propranolol  Hyperlipidemia - continue atorvastatin  Weakness-c/w supportive care      Electronically Signed By: Monie Clark MD   24 10:04 AM

## 2024-08-08 NOTE — PROGRESS NOTES
Subjective- resident seen for follow up. She is sitting in her room. Tremors present  Review of systems-  General-weakness+  Chest-No pain or palpitations  Respiratory-no cough  Abdomen-no pain, no nausea no vomiting no diarrhea  Extremities-no swelling  Psych-confusion present  Neurology-tremors present  Vital signs-blood pressure 147/52  Temperature 97.9  Pulse-100  Weight-109.6 pounds  Physical exam-  General-no acute distress, pleasant, confused  Neck-no mass or JVD  Chest- CTA B/L  CVS- S1S2 RRR  Abdomen-soft, NT,BS+  Extremities-no edema  Neurology-no gross motor deficits  Resting hand tremors present  Assessment and plan-  Dementia-  c/w supportive care  Continue with Seroquel for behaviors  Parkinson's disease- c/w carbidopa/levodopa  Neurology follow up  Hypertension-stable  Continue with propranolol  Hyperlipidemia - continue atorvastatin  Weakness-c/w supportive care

## 2024-09-04 ENCOUNTER — NURSING HOME VISIT (OUTPATIENT)
Dept: POST ACUTE CARE | Facility: EXTERNAL LOCATION | Age: 89
End: 2024-09-04
Payer: COMMERCIAL

## 2024-09-04 DIAGNOSIS — F01.A18 MILD VASCULAR DEMENTIA WITH OTHER BEHAVIORAL DISTURBANCE (MULTI): ICD-10-CM

## 2024-09-04 DIAGNOSIS — I10 PRIMARY HYPERTENSION: Primary | ICD-10-CM

## 2024-09-04 DIAGNOSIS — G20.A1 PARKINSON'S DISEASE WITHOUT DYSKINESIA OR FLUCTUATING MANIFESTATIONS (MULTI): ICD-10-CM

## 2024-09-04 NOTE — LETTER
Patient: Ofelia Cabral  : 12/10/1934    Encounter Date: 2024    Subjective- resident seen for follow up. She is lying in her room. Tremors present  Review of systems-  General-weakness+  Chest-No pain or palpitations  Respiratory-no cough  Abdomen-no pain, no nausea no vomiting no diarrhea  Extremities-no swelling  Psych-confusion present  Neurology-tremors present  Vital signs-blood pressure 147/52  Temperature 98.1  Pulse-100  Weight-109.6 pounds  Physical exam-  General-no acute distress, pleasant, confused  Neck-no mass or JVD  Chest- CTA B/L  CVS- S1S2 RRR  Abdomen-soft, NT,BS+  Extremities-no edema  Neurology-no gross motor deficits  Resting hand tremors present  Assessment and plan-  Dementia-  c/wsupportive care  Continue with Seroquel for behaviors  Parkinson's disease- c/w carbidopa/levodopa  Neurology follow up  Hypertension-stable  Continue with propranolol  Hyperlipidemia - continue atorvastatin  Weakness-c/w supportive care      Electronically Signed By: Monie Clark MD   24  4:54 PM

## 2024-09-05 NOTE — PROGRESS NOTES
Subjective- resident seen for follow up. She is lying in her room. Tremors present  Review of systems-  General-weakness+  Chest-No pain or palpitations  Respiratory-no cough  Abdomen-no pain, no nausea no vomiting no diarrhea  Extremities-no swelling  Psych-confusion present  Neurology-tremors present  Vital signs-blood pressure 147/52  Temperature 98.1  Pulse-100  Weight-109.6 pounds  Physical exam-  General-no acute distress, pleasant, confused  Neck-no mass or JVD  Chest- CTA B/L  CVS- S1S2 RRR  Abdomen-soft, NT,BS+  Extremities-no edema  Neurology-no gross motor deficits  Resting hand tremors present  Assessment and plan-  Dementia-  c/wsupportive care  Continue with Seroquel for behaviors  Parkinson's disease- c/w carbidopa/levodopa  Neurology follow up  Hypertension-stable  Continue with propranolol  Hyperlipidemia - continue atorvastatin  Weakness-c/w supportive care

## 2024-09-18 ENCOUNTER — APPOINTMENT (OUTPATIENT)
Dept: NEUROLOGY | Facility: CLINIC | Age: 89
End: 2024-09-18
Payer: MEDICARE

## 2024-09-18 VITALS
WEIGHT: 103 LBS | DIASTOLIC BLOOD PRESSURE: 63 MMHG | RESPIRATION RATE: 16 BRPM | HEART RATE: 62 BPM | SYSTOLIC BLOOD PRESSURE: 112 MMHG

## 2024-09-18 DIAGNOSIS — G20.A1 PARKINSON'S DISEASE WITHOUT DYSKINESIA OR FLUCTUATING MANIFESTATIONS: Primary | ICD-10-CM

## 2024-09-18 PROCEDURE — 99214 OFFICE O/P EST MOD 30 MIN: CPT | Performed by: NURSE PRACTITIONER

## 2024-09-18 PROCEDURE — 3078F DIAST BP <80 MM HG: CPT | Performed by: NURSE PRACTITIONER

## 2024-09-18 PROCEDURE — 1159F MED LIST DOCD IN RCRD: CPT | Performed by: NURSE PRACTITIONER

## 2024-09-18 PROCEDURE — 1160F RVW MEDS BY RX/DR IN RCRD: CPT | Performed by: NURSE PRACTITIONER

## 2024-09-18 PROCEDURE — 3074F SYST BP LT 130 MM HG: CPT | Performed by: NURSE PRACTITIONER

## 2024-09-18 RX ORDER — ERGOCALCIFEROL 1.25 MG/1
1.25 CAPSULE ORAL WEEKLY
COMMUNITY

## 2024-09-18 RX ORDER — SENNOSIDES 8.6 MG/1
8.6 CAPSULE, GELATIN COATED ORAL NIGHTLY
COMMUNITY

## 2024-09-18 RX ORDER — POLYETHYLENE GLYCOL 3350 17 G/17G
17 POWDER, FOR SOLUTION ORAL DAILY PRN
COMMUNITY

## 2024-09-18 ASSESSMENT — UNIFIED PARKINSONS DISEASE RATING SCALE (UPDRS)
AMPLITUDE_LUE: 1
KINETIC_TREMOR_LEFTHAND: 1
FINGER_TAPPING_RIGHT: 1
TOTAL_SCORE: 49
SPONTANEITY_OF_MOVEMENT: 3
LEG_AGILITY_LEFT: 3
RIGIDITY_LLE: 0
HANDMOVEMENTS_RIGHT: 1
TOETAPPING_LEFT: 3
RIGIDITY_RUE: 1
POSTURE: 2
POSTURAL_TREMOR_RIGHTHAND: 0
AMPLITUDE_LLE: 1
FACIAL_EXPRESSION: 2
TOETAPPING_RIGHT: 3
CLINICAL_STATE: ON
RIGIDITY_RLE: 1
CHAIR_RISING_SCALE: 2
CONSTANCY_TREMOR_ATREST: 4
PRONATION_SUPINATION_LEFT: 1
FINGER_TAPPING_LEFT: 1
PRONATION_SUPINATION_RIGHT: 1
AMPLITUDE_RUE: 2
AMPLITUDE_LIP_JAW: 1
DYSKINESIAS_PRESENT: NO
PARKINSONS_MEDS: YES
LEVODOPA: YES
POSTURAL_STABILITY: 0
FREEZING_GAIT: 0
POSTURAL_TREMOR_LEFTHAND: 0
GAIT: 3
LEG_AGILITY_RIGHT: 3
AMPLITUDE_RLE: 2
KINETIC_TREMOR_RIGHTHAND: 1
RIGIDITY_LUE: 0
SPEECH: 2
RIGIDITY_NECK: 2
MINUTES_SINCE_LEVODOPA: ?

## 2024-09-18 ASSESSMENT — PATIENT HEALTH QUESTIONNAIRE - PHQ9
SUM OF ALL RESPONSES TO PHQ9 QUESTIONS 1 AND 2: 0
2. FEELING DOWN, DEPRESSED OR HOPELESS: NOT AT ALL
1. LITTLE INTEREST OR PLEASURE IN DOING THINGS: NOT AT ALL

## 2024-09-18 ASSESSMENT — ENCOUNTER SYMPTOMS
OCCASIONAL FEELINGS OF UNSTEADINESS: 1
LOSS OF SENSATION IN FEET: 0
DEPRESSION: 0

## 2024-09-18 NOTE — PROGRESS NOTES
Subjective     Ofelia Cabral is a 89 y.o. year old female who presents with parkinsonism, here for follow up visit.    HPI  Initial visit with Dr. Dietrich and started Carbidopa-levodopa 25/100mg     Daughter who lived in [place] and now back here so did not see her much before and seeing her a few hours a day. She provides most of HPI. She also asked I speak with the nurse who sees her more often than her.   Tremors helped somewhat by Sinemet, not shaking as bad.   Walking unchanged.   Slowness is unchanged.   Socializing and speaking more.     Daughter does not want her on higher dose of Sinemet it as has tried it before and not helpful-had severe hallucinations.  No hallucinations now but last time they were severe. Always talking about a baby.     Tired all the time but no sure Sinemet worsened.     Tremors are bothersome, she c/o them sometimes.  Tremors with action/eating and drinking too.  No falls. Uses quad cane.     Appetite decreased the past 3 weeks when usually appetite is good.     I spoke with nurse Kristin, Sinemet has been helpful somewhat. She has no concerns.   No recent labs.     Social  Lives with: SNF  Help with ADLs: assist except can feed herself         [facility] Meds  Med Dose Time   Carbidopa-levodopa 25/100mg 1 tab 3 times a day              Latency unaware    Wearing off none    Side effects     Dyskinesia None    Hallucinations None    Other None       Prior medications:    Pertinent testing:      Patient Health Questionnaire-2 Score: 0            Current Outpatient Medications:     atorvastatin (Lipitor) 10 mg tablet, , Disp: , Rfl:     carbidopa-levodopa (Sinemet)  mg tablet, Take half tablet three times a day for 3 weeks then take one tablet three times a day, Disp: 120 tablet, Rfl: 3    ergocalciferol (Vitamin D-2) 1.25 MG (70234 UT) capsule, Take 1 capsule (1,250 mcg) by mouth 1 (one) time per week., Disp: , Rfl:     polyethylene glycol (Glycolax, Miralax) 17  gram packet, Take 17 g by mouth once daily as needed., Disp: , Rfl:     propranolol LA (Inderal LA) 80 mg 24 hr capsule, , Disp: , Rfl:     sennosides (senna) 8.6 mg capsule, Take 1 capsule (8.6 mg) by mouth Every night. qM-Th-Sat, Disp: , Rfl:        Objective   Vitals:    09/18/24 1249 09/18/24 1252   BP: 130/61 112/63   BP Location: Right arm Right arm   Patient Position: Sitting Standing   BP Cuff Size: Small adult Small adult   Pulse: 60 62   Resp: 16    Weight: 46.7 kg (103 lb)                  Physical Exam    MDS UPDRS 1st Score: Motor Examination  Is the patient on medication for treating the symptoms of Parkinson's Disease?: Yes  Patients receiving medication for treating the symptoms of Parkinson's Disease, grover the patient's clinical state.: On  Is the patient on Levodopa?: Yes  Minutes since last Levodopa dose: ?  Speech: 2  Facial Expression: 2  Rigidty Neck: 2  Rigidty RUE: 1  Rigidity - LUE: 0  Rigidity RLE: 1  Rigidity LLE: 0  Finger Tapping Right Hand: 1  Finger Tapping Left Hand: 1  Hand Movements- Right Hand: 1  Hand Movements- Left Hand: 1  Pronatiaon-Supination Movments - Right Hand: 1  Pronatiaon-Supination Movments Left Hand: 1  Toe Tapping Right Foot: 3  Toe Tapping - Left Foot: 3  Leg Agility - Right Leg: 3  Leg Agility - Left leg: 3  Arising from Chair: 2  Gait: 3  Freezing of Gait: 0  Postural Stability: 0 (not tested)  Posture: 2  Global Spontanteity of Movment ( Body Bradykinesia): 3  Postural Tremor - Right Hand: 0  Postural Tremor - Left hand: 0  Kinetic Tremor - Right hand: 1  Kinetic Tremor - Left hand: 1  Rest Tremor Amplitude - RUE: 2  Rest Tremor Amplitude - LUE: 1  Rest Tremor Amplitude - RLE: 2  Rest Tremor Amplitude - LLE: 1  Rest Tremor Amplitude - Lip/Jaw: 1  Constancy of Rest Tremor: 4  MDS UPDRS Total Score: 49  Were dyskinesias (chorea or dystonia) present during examination?: No        Assessment/Plan   Ms. Ofelia Cabral is a 89 y.o. F with dementia, seen in follow up  for parkinsonism and tremors. Tremors started 10 yrs ago, previously tried on sinemet but d/c due to severe hallucinations and though currently on low dose restarted last visit she is having benefit, daughter declines to increase further. No noted SE from daughter or nurse Kristin. Decreased appetite the past 3W so does not seem r/t Sinemet initiation back in June.   Dicussed the option of treating hallucinations to allow for further increase in Sinemet but declines. Dr. Dietrich also present to discuss options with daughter and patient, she is already on propranolol and so will try primidone. I spoke with nurse Kristin to order labs to be faxed to me for baseline and 6W follow labs after starting meds.   Sx are suggestive of PDD vs Dementia with Lewy body.     Diagnoses and all orders for this visit:  Parkinson's disease without dyskinesia or fluctuating manifestations (Multi)    #Continue carbidopa-levodopa 25/100mg 1 tab 3 times a day    Though helping tremors, will not increase due to worsening hallucinations you had in the past    #Stop melatonin 1mg--patient denies issues sleeping, can always restart if needed/issues sleeping     #Check CBC, CMP for baseline    Please fax results to [phone] attention NP Ric    #Recheck CBC and CMP in 6 weeks to monitor once labs taken    Please fax results to [phone] attention NP Ric    #For tremor, start primidone (Mysoline) 50mg tablet with the following titration schedule:   WEEK 1: take 0.5 (half) tab in the am  WEEK 2: take 0.5 tab 2 times a day  WEEK 3: take 0.5 tab 3 times a day    OK to stop increasing if tremor improves at a lower dose and let me know.  Avoid stopping this medication abruptly, needs slowly decreased down to discontinue, call the office for this.    Side effects include but are not limited to drowsiness, nausea, dizziness, fatigue, clumsiness.  For these or any other side effects, please call our clinic for further instructions.    #Follow up  in 3M with me on a day when Dr. Dietrich is here      Ric Campoverde, NP-C  Adult/Gerontological Nurse Practitioner   [facility], Department of Neurology  Neurological [place]  [facility]  [address] [id number]  Phone: [phone]  Fax: [phone]

## 2024-09-18 NOTE — PATIENT INSTRUCTIONS
#Continue carbidopa-levodopa 25/100mg 1 tab 3 times a day    Though helping tremors, will not increase due to worsening hallucinations you had in the past    #Stop melatonin 1mg--patient denies issues sleeping, can always restart if needed/issues sleeping     #Check CBC, CMP for baseline    Please fax results to [phone] attention NP Ric    #Recheck CBC and CMP in 6 weeks to monitor once labs taken    Please fax results to [phone] attention NP Ric    #For tremor, start primidone (Mysoline) 50mg tablet with the following titration schedule:   WEEK 1: take 0.5 (half) tab in the am  WEEK 2: take 0.5 tab 2 times a day  WEEK 3: take 0.5 tab 3 times a day    OK to stop increasing if tremor improves at a lower dose and let me know.  Avoid stopping this medication abruptly, needs slowly decreased down to discontinue, call the office for this.    Side effects include but are not limited to drowsiness, nausea, dizziness, fatigue, clumsiness.  For these or any other side effects, please call our clinic for further instructions.    #Follow up in 3M with me on a day when Dr. Dietrich is here        Ric Campoverde NP-C  Adult/Gerontological Nurse Practitioner   [facility], Department of Neurology  Neurological [facility]  [address] [place] [address]  Phone: [phone]  Fax: [phone]

## 2024-09-25 ENCOUNTER — TELEPHONE (OUTPATIENT)
Dept: NEUROLOGY | Facility: CLINIC | Age: 89
End: 2024-09-25
Payer: COMMERCIAL

## 2024-09-25 NOTE — TELEPHONE ENCOUNTER
Requested CMP, recieved BMP--I have tried calling SNF several times to clarify next order needs to be CMP and CBC needs to have diff--I spoke with nurse Kristin to clarify--she ordered these as well as a folate    9/19/24  CBC wnl except RDW 16.2  BMP wnl  TSH wnl

## 2024-09-30 ENCOUNTER — APPOINTMENT (OUTPATIENT)
Dept: OPHTHALMOLOGY | Facility: CLINIC | Age: 89
End: 2024-09-30
Payer: COMMERCIAL

## 2024-10-09 ENCOUNTER — NURSING HOME VISIT (OUTPATIENT)
Dept: POST ACUTE CARE | Facility: EXTERNAL LOCATION | Age: 89
End: 2024-10-09
Payer: COMMERCIAL

## 2024-10-09 DIAGNOSIS — R53.1 WEAKNESS: ICD-10-CM

## 2024-10-09 DIAGNOSIS — G20.A1 PARKINSON'S DISEASE WITHOUT DYSKINESIA OR FLUCTUATING MANIFESTATIONS: ICD-10-CM

## 2024-10-09 DIAGNOSIS — I10 PRIMARY HYPERTENSION: Primary | ICD-10-CM

## 2024-10-09 DIAGNOSIS — E78.49 OTHER HYPERLIPIDEMIA: ICD-10-CM

## 2024-10-09 NOTE — LETTER
Patient: Ofelia Cabral  : 12/10/1934    Encounter Date: 10/09/2024    Subjective- resident seen for follow up. She is sitting in dining room. Seen by neurology and started on primidone.  Review of systems-  General-weakness+  Chest-No pain or palpitations  Respiratory-no cough  Abdomen-no pain, no nausea no vomiting no diarrhea  Extremities-no swelling  Psych-confusion present  Neurology-tremors present  Vital signs-blood pressure 147/52  Temperature 97  Pulse-100  Weight-105.7 pounds  Physical exam-  General-no acute distress, pleasant, confused  Neck-no mass or JVD  Chest- CTA B/L  CVS- S1S2 RRR  Abdomen-soft, NT,BS+  Extremities-no edema  Neurology-no gross motor deficits  Resting hand tremors present  Assessment and plan-  Dementia-  c/w supportive care  Continue with Seroquel for behaviors  Parkinson's disease- c/w carbidopa/levodopa, primidone  Hypertension-stable  Continue with propranolol  Hyperlipidemia - continue atorvastatin  Weakness-c/w supportive care  Labs tomorrow      Electronically Signed By: Monie Clark MD   10/10/24  8:19 AM

## 2024-10-10 NOTE — PROGRESS NOTES
Subjective- resident seen for follow up. She is sitting in dining room. Seen by neurology and started on primidone.  Review of systems-  General-weakness+  Chest-No pain or palpitations  Respiratory-no cough  Abdomen-no pain, no nausea no vomiting no diarrhea  Extremities-no swelling  Psych-confusion present  Neurology-tremors present  Vital signs-blood pressure 147/52  Temperature 97  Pulse-100  Weight-105.7 pounds  Physical exam-  General-no acute distress, pleasant, confused  Neck-no mass or JVD  Chest- CTA B/L  CVS- S1S2 RRR  Abdomen-soft, NT,BS+  Extremities-no edema  Neurology-no gross motor deficits  Resting hand tremors present  Assessment and plan-  Dementia-  c/w supportive care  Continue with Seroquel for behaviors  Parkinson's disease- c/w carbidopa/levodopa, primidone  Hypertension-stable  Continue with propranolol  Hyperlipidemia - continue atorvastatin  Weakness-c/w supportive care  Labs tomorrow

## 2024-11-06 ENCOUNTER — NURSING HOME VISIT (OUTPATIENT)
Dept: POST ACUTE CARE | Facility: EXTERNAL LOCATION | Age: 89
End: 2024-11-06
Payer: COMMERCIAL

## 2024-11-06 ENCOUNTER — TELEPHONE (OUTPATIENT)
Dept: NEUROLOGY | Facility: CLINIC | Age: 89
End: 2024-11-06
Payer: COMMERCIAL

## 2024-11-06 DIAGNOSIS — R53.1 WEAKNESS: ICD-10-CM

## 2024-11-06 DIAGNOSIS — I10 PRIMARY HYPERTENSION: Primary | ICD-10-CM

## 2024-11-06 DIAGNOSIS — E78.49 OTHER HYPERLIPIDEMIA: ICD-10-CM

## 2024-11-06 DIAGNOSIS — F01.A18 MILD VASCULAR DEMENTIA WITH OTHER BEHAVIORAL DISTURBANCE: ICD-10-CM

## 2024-11-06 DIAGNOSIS — G20.A1 PARKINSON'S DISEASE WITHOUT DYSKINESIA OR FLUCTUATING MANIFESTATIONS: ICD-10-CM

## 2024-11-06 NOTE — LETTER
Patient: Ofelia Cabral  : 12/10/1934    Encounter Date: 2024    Subjective- resident seen for follow up. She is sitting in activity room. Appears sleepy today.  Review of systems-  General-weakness+  Chest-Nopain or palpitations  Respiratory-no cough  Abdomen-no pain, no nausea no vomiting no diarrhea  Extremities-no swelling  Psych-confusion present  Neurology-tremors present  Vital signs-blood pressure 147/52  Temperature 97  Pulse-100  Weight-108.7 pounds    Physical exam-  General-no acute distress, pleasant, confused  Neck-no mass or JVD  Chest- CTA B/L  CVS- S1S2 RRR  Abdomen-soft, NT,BS+  Extremities-no edema  Neurology-no gross motor deficits  Resting hand tremors present    Assessment and plan-  Dementia-  c/w supportive care  Continue with Seroquel for behaviors  Parkinson's disease- c/w carbidopa/levodopa, primidone  Hypertension-stable  Continue with propranolol  Hyperlipidemia - continue atorvastatin  Weakness-c/w supportive care      Electronically Signed By: Monie Clark MD   24  8:31 AM

## 2024-11-06 NOTE — TELEPHONE ENCOUNTER
Labs for monitoring Primidone reviewed:    11/1/24:   CMP wnl except BUN/CR ratio is 25  CBC w/ diff with low RBC (3.88), L Hgb (11.7) L Hct (34.9), L neutrophils (28.3), H lymph 50.9, H monocytes 20.1    TSH 10/10/24: wnl at 0.369     Called and spoke with nurse Foster today and gave orders to recheck CBC w/ diff in 4 weeks.     Nurse Foster notes that her tremors have improved on primidone.     If continues to trend down will have to d/c primidone.

## 2024-11-07 NOTE — PROGRESS NOTES
Subjective- resident seen for follow up. She is sitting in activity room. Appears sleepy today.  Review of systems-  General-weakness+  Chest-Nopain or palpitations  Respiratory-no cough  Abdomen-no pain, no nausea no vomiting no diarrhea  Extremities-no swelling  Psych-confusion present  Neurology-tremors present  Vital signs-blood pressure 147/52  Temperature 97  Pulse-100  Weight-108.7 pounds    Physical exam-  General-no acute distress, pleasant, confused  Neck-no mass or JVD  Chest- CTA B/L  CVS- S1S2 RRR  Abdomen-soft, NT,BS+  Extremities-no edema  Neurology-no gross motor deficits  Resting hand tremors present    Assessment and plan-  Dementia-  c/w supportive care  Continue with Seroquel for behaviors  Parkinson's disease- c/w carbidopa/levodopa, primidone  Hypertension-stable  Continue with propranolol  Hyperlipidemia - continue atorvastatin  Weakness-c/w supportive care

## 2024-12-04 ENCOUNTER — NURSING HOME VISIT (OUTPATIENT)
Dept: POST ACUTE CARE | Facility: EXTERNAL LOCATION | Age: 89
End: 2024-12-04
Payer: COMMERCIAL

## 2024-12-04 DIAGNOSIS — G20.A1 PARKINSON'S DISEASE WITHOUT DYSKINESIA OR FLUCTUATING MANIFESTATIONS: ICD-10-CM

## 2024-12-04 DIAGNOSIS — E78.49 OTHER HYPERLIPIDEMIA: ICD-10-CM

## 2024-12-04 DIAGNOSIS — I10 PRIMARY HYPERTENSION: Primary | ICD-10-CM

## 2024-12-04 DIAGNOSIS — R53.1 WEAKNESS: ICD-10-CM

## 2024-12-04 PROCEDURE — 99308 SBSQ NF CARE LOW MDM 20: CPT | Performed by: INTERNAL MEDICINE

## 2024-12-04 NOTE — LETTER
Patient: Ofelia Cabral  : 12/10/1934    Encounter Date: 2024    Subjective- resident seen for follow up. She is sitting in activity room. Appears sleepy today.  Review of systems-  General-weakness+  Chest-Nopain or palpitations  Respiratory-no cough  Abdomen-no pain, no nausea no vomiting no diarrhea  Extremities-no swelling  Psych-confusion present  Neurology-tremors present  Vital signs-blood pressure 142/62  Temperature 97.6  Pulse-71  Weight-106.6 pounds  Physical exam-  General-no acute distress, pleasant, confused  Neck-no mass or JVD  Chest- CTA B/L  CVS- S1S2 RRR  Abdomen-soft, NT,BS+  Extremities-no edema  Neurology-no gross motor deficits  Resting hand tremors present  Assessment and plan-  Dementia-  c/w supportive care  Continue with Seroquel for behaviors  Parkinson's disease- c/w carbidopa/levodopa, primidone  Hypertension-stable  Continue with propranolol  Hyperlipidemia - continue atorvastatin  Weakness-c/w supportive care      Electronically Signed By: Monie Clark MD   24  6:58 PM

## 2024-12-08 NOTE — PROGRESS NOTES
Subjective- resident seen for follow up. She is sitting in activity room. Appears sleepy today.  Review of systems-  General-weakness+  Chest-Nopain or palpitations  Respiratory-no cough  Abdomen-no pain, no nausea no vomiting no diarrhea  Extremities-no swelling  Psych-confusion present  Neurology-tremors present  Vital signs-blood pressure 142/62  Temperature 97.6  Pulse-71  Weight-106.6 pounds  Physical exam-  General-no acute distress, pleasant, confused  Neck-no mass or JVD  Chest- CTA B/L  CVS- S1S2 RRR  Abdomen-soft, NT,BS+  Extremities-no edema  Neurology-no gross motor deficits  Resting hand tremors present  Assessment and plan-  Dementia-  c/w supportive care  Continue with Seroquel for behaviors  Parkinson's disease- c/w carbidopa/levodopa, primidone  Hypertension-stable  Continue with propranolol  Hyperlipidemia - continue atorvastatin  Weakness-c/w supportive care

## 2024-12-18 ENCOUNTER — APPOINTMENT (OUTPATIENT)
Dept: NEUROLOGY | Facility: CLINIC | Age: 89
End: 2024-12-18
Payer: COMMERCIAL

## 2024-12-19 NOTE — PROGRESS NOTES
Labs from 12/05/24 reviewed and CBC improved, no longer with low RBC, low H&H, neutrophils improved from 28.3 to 35.6    Can follow up labs at next visit with me in March

## 2025-01-08 ENCOUNTER — NURSING HOME VISIT (OUTPATIENT)
Dept: POST ACUTE CARE | Facility: EXTERNAL LOCATION | Age: OVER 89
End: 2025-01-08
Payer: COMMERCIAL

## 2025-01-08 DIAGNOSIS — G20.A1 PARKINSON'S DISEASE WITHOUT DYSKINESIA OR FLUCTUATING MANIFESTATIONS: ICD-10-CM

## 2025-01-08 DIAGNOSIS — I10 PRIMARY HYPERTENSION: Primary | ICD-10-CM

## 2025-01-08 DIAGNOSIS — R53.1 WEAKNESS: ICD-10-CM

## 2025-01-08 DIAGNOSIS — E78.49 OTHER HYPERLIPIDEMIA: ICD-10-CM

## 2025-01-08 DIAGNOSIS — F01.A18 MILD VASCULAR DEMENTIA WITH OTHER BEHAVIORAL DISTURBANCE: ICD-10-CM

## 2025-01-08 NOTE — LETTER
Patient: Ofelia Cabral  : 12/10/1934    Encounter Date: 2025    Subjective- resident seen for follow up. She is lying in her room. Appears sleepy today.  Review of systems-  General-weakness+  Chest-No pain or palpitations  Respiratory-no cough  Abdomen-no pain, no nausea no vomiting no diarrhea  Extremities-no swelling  Psych-confusion present  Neurology-tremors present  Vital signs-blood pressure 142/62  Temperature 97.6  Pulse-71  Weight-109.6 pounds  Physical exam-  General-no acute distress, pleasant, confused  Neck-no mass or JVD  Chest- CTA B/L  CVS- S1S2 RRR  Abdomen-soft, NT,BS+  Extremities-no edema  Neurology-no gross motor deficits  Resting hand tremors present  Assessment and plan-  Dementia-  c/w supportive care  Continue with Seroquel for behaviors  Parkinson's disease- c/w carbidopa/levodopa, primidone  Hypertension-stable  Continue with propranolol  Hyperlipidemia - continue atorvastatin  Weakness-c/w supportive care      Electronically Signed By: Monie Clark MD   1/10/25  1:53 PM

## 2025-01-10 NOTE — PROGRESS NOTES
Subjective- resident seen for follow up. She is lying in her room. Appears sleepy today.  Review of systems-  General-weakness+  Chest-No pain or palpitations  Respiratory-no cough  Abdomen-no pain, no nausea no vomiting no diarrhea  Extremities-no swelling  Psych-confusion present  Neurology-tremors present  Vital signs-blood pressure 142/62  Temperature 97.6  Pulse-71  Weight-109.6 pounds  Physical exam-  General-no acute distress, pleasant, confused  Neck-no mass or JVD  Chest- CTA B/L  CVS- S1S2 RRR  Abdomen-soft, NT,BS+  Extremities-no edema  Neurology-no gross motor deficits  Resting hand tremors present  Assessment and plan-  Dementia-  c/w supportive care  Continue with Seroquel for behaviors  Parkinson's disease- c/w carbidopa/levodopa, primidone  Hypertension-stable  Continue with propranolol  Hyperlipidemia - continue atorvastatin  Weakness-c/w supportive care

## 2025-02-05 ENCOUNTER — NURSING HOME VISIT (OUTPATIENT)
Dept: POST ACUTE CARE | Facility: EXTERNAL LOCATION | Age: OVER 89
End: 2025-02-05
Payer: COMMERCIAL

## 2025-02-05 DIAGNOSIS — G20.A1 PARKINSON'S DISEASE WITHOUT DYSKINESIA OR FLUCTUATING MANIFESTATIONS: Primary | ICD-10-CM

## 2025-02-05 DIAGNOSIS — E78.49 OTHER HYPERLIPIDEMIA: ICD-10-CM

## 2025-02-05 DIAGNOSIS — R53.1 WEAKNESS: ICD-10-CM

## 2025-02-05 DIAGNOSIS — F01.A18 MILD VASCULAR DEMENTIA WITH OTHER BEHAVIORAL DISTURBANCE: ICD-10-CM

## 2025-02-05 DIAGNOSIS — I10 PRIMARY HYPERTENSION: ICD-10-CM

## 2025-02-05 NOTE — LETTER
Patient: Ofelia Cabral  : 12/10/1934    Encounter Date: 2025    Subjective- resident seen for follow up. She is lying in her room. Denies any pain. no fall. Appetite is good.  Review of systems-  General-weakness+  Chest-No pain or palpitations  Respiratory-no cough  Abdomen-no pain, no nausea no vomiting no diarrhea  Extremities-no swelling  Psych-confusion present  Neurology-tremors present  Vital signs-blood pressure 142/62  Temperature 97.9  Pulse-71  Weight-111.6 pounds  Physical exam-  General-no acute distress, pleasant, confused  Neck-no mass or JVD  Chest- CTA B/L  CVS- S1S2 RRR  Abdomen-soft, NT,BS+  Extremities-no edema  Neurology-no gross motor deficits  Resting hand tremors present    Assessment and plan-  Dementia-  c/w supportive care  Continue with Seroquel for behaviors  Parkinson's disease- c/w carbidopa/levodopa, primidone  Neuro appt 3/3  Hypertension-stable  Continue with propranolol  Hyperlipidemia - continue atorvastatin  Weakness-c/w supportive care      Electronically Signed By: Monie Clark MD   25  9:58 AM

## 2025-02-06 PROBLEM — F03.90 DEMENTIA: Status: RESOLVED | Noted: 2023-08-02 | Resolved: 2025-02-06

## 2025-02-06 NOTE — PROGRESS NOTES
Subjective- resident seen for follow up. She is lying in her room. Denies any pain. no fall. Appetite is good.  Review of systems-  General-weakness+  Chest-No pain or palpitations  Respiratory-no cough  Abdomen-no pain, no nausea no vomiting no diarrhea  Extremities-no swelling  Psych-confusion present  Neurology-tremors present  Vital signs-blood pressure 142/62  Temperature 97.9  Pulse-71  Weight-111.6 pounds  Physical exam-  General-no acute distress, pleasant, confused  Neck-no mass or JVD  Chest- CTA B/L  CVS- S1S2 RRR  Abdomen-soft, NT,BS+  Extremities-no edema  Neurology-no gross motor deficits  Resting hand tremors present    Assessment and plan-  Dementia-  c/w supportive care  Continue with Seroquel for behaviors  Parkinson's disease- c/w carbidopa/levodopa, primidone  Neuro appt 3/3  Hypertension-stable  Continue with propranolol  Hyperlipidemia - continue atorvastatin  Weakness-c/w supportive care

## 2025-03-03 ENCOUNTER — APPOINTMENT (OUTPATIENT)
Dept: NEUROLOGY | Facility: CLINIC | Age: OVER 89
End: 2025-03-03
Payer: COMMERCIAL

## 2025-03-03 VITALS
WEIGHT: 113 LBS | SYSTOLIC BLOOD PRESSURE: 107 MMHG | HEART RATE: 76 BPM | RESPIRATION RATE: 16 BRPM | DIASTOLIC BLOOD PRESSURE: 66 MMHG

## 2025-03-03 DIAGNOSIS — G20.A1 PARKINSON'S DISEASE WITHOUT DYSKINESIA OR FLUCTUATING MANIFESTATIONS: Primary | ICD-10-CM

## 2025-03-03 DIAGNOSIS — R44.3 HALLUCINATIONS: ICD-10-CM

## 2025-03-03 PROCEDURE — 1159F MED LIST DOCD IN RCRD: CPT | Performed by: NURSE PRACTITIONER

## 2025-03-03 PROCEDURE — 1160F RVW MEDS BY RX/DR IN RCRD: CPT | Performed by: NURSE PRACTITIONER

## 2025-03-03 PROCEDURE — 3074F SYST BP LT 130 MM HG: CPT | Performed by: NURSE PRACTITIONER

## 2025-03-03 PROCEDURE — 99214 OFFICE O/P EST MOD 30 MIN: CPT | Performed by: NURSE PRACTITIONER

## 2025-03-03 PROCEDURE — 3078F DIAST BP <80 MM HG: CPT | Performed by: NURSE PRACTITIONER

## 2025-03-03 ASSESSMENT — UNIFIED PARKINSONS DISEASE RATING SCALE (UPDRS)
RIGIDITY_NECK: 0
LEG_AGILITY_RIGHT: 2
CLINICAL_STATE: ON
GAIT: 3
LEVODOPA: YES
KINETIC_TREMOR_LEFTHAND: 0
RIGIDITY_LLE: 0
AMPLITUDE_LIP_JAW: 2
FACIAL_EXPRESSION: 2
AMPLITUDE_LUE: 2
LEG_AGILITY_LEFT: 3
TOETAPPING_RIGHT: 3
CONSTANCY_TREMOR_ATREST: 4
RIGIDITY_RLE: 0
SPONTANEITY_OF_MOVEMENT: 2
RIGIDITY_LUE: 2
TOTAL_SCORE: 50
AMPLITUDE_LLE: 2
FREEZING_GAIT: 0
POSTURAL_TREMOR_RIGHTHAND: 0
CHAIR_RISING_SCALE: 2
AMPLITUDE_RLE: 1
POSTURAL_STABILITY: 0
PARKINSONS_MEDS: YES
KINETIC_TREMOR_RIGHTHAND: 0
FINGER_TAPPING_RIGHT: 1
PRONATION_SUPINATION_LEFT: 2
RIGIDITY_RUE: 2
FINGER_TAPPING_LEFT: 1
POSTURE: 3
HANDMOVEMENTS_RIGHT: 1
PRONATION_SUPINATION_RIGHT: 1
DYSKINESIAS_PRESENT: NO
TOETAPPING_LEFT: 3
AMPLITUDE_RUE: 2
POSTURAL_TREMOR_LEFTHAND: 0
SPEECH: 2

## 2025-03-03 ASSESSMENT — ENCOUNTER SYMPTOMS
OCCASIONAL FEELINGS OF UNSTEADINESS: 1
LOSS OF SENSATION IN FEET: 0
DEPRESSION: 0

## 2025-03-03 ASSESSMENT — PATIENT HEALTH QUESTIONNAIRE - PHQ9
1. LITTLE INTEREST OR PLEASURE IN DOING THINGS: NOT AT ALL
2. FEELING DOWN, DEPRESSED OR HOPELESS: NOT AT ALL
SUM OF ALL RESPONSES TO PHQ9 QUESTIONS 1 AND 2: 0

## 2025-03-03 NOTE — PATIENT INSTRUCTIONS
#Continue carbidopa-levodopa 25/100mg 1 tab 3 times a day    Though helping tremors, will not increase due to worsening hallucinations you had in the past    #Decrease primidone as follows:     Since not helpful and has had some falls, already sedated it may worsen these issues so will wean off.     Week 1: primidone 50mg 0.5tab 2 times a day  Week 2: primidone 502mg 0.5tab daily then stop    #Follow up in 6M with Dr. Shaikh Ric Campoverde, NP-C  Adult/Gerontological Nurse Practitioner   [facility], Department of Neurology  Neurological [place]  [facility]  [address] [id number]  Phone: [phone]  Fax: [phone]

## 2025-03-03 NOTE — PROGRESS NOTES
Subjective     Ofelia Cabral is a 89 y.o. year old female who presents with No chief complaint on file., here for follow up visit.    HPI  Last visit with me 9/18/24:  #Continue carbidopa-levodopa 25/100mg 1 tab 3 times a day  Though helping tremors, will not increase due to worsening hallucinations you had in the past  #Stop melatonin 1mg--patient denies issues sleeping, can always restart if needed/issues sleeping   #Check CBC, CMP for baseline  Please fax results to [phone] attention NP Ric  #Recheck CBC and CMP in 6 weeks to monitor once labs taken  Please fax results to [phone] attention NP Ric  #For tremor, start primidone (Mysoline) 50mg tablet with the following titration schedule:   WEEK 1: take 0.5 (half) tab in the am  WEEK 2: take 0.5 tab 2 times a day  WEEK 3: take 0.5 tab 3 times a day  #Follow up in 3M with me on a day when Dr. Dietrich is here      Jolanta Cabral is guardian [phone] who comes to visit. Daughter is here often, from [place].     Daughter concerned that primidone is not helping, tremors all the time.  Always says she is sleepy and has fallen several times since last visit, about 1x/M.     Labs from 12/05/24 reviewed and CBC improved, no longer with low RBC, low H&H, neutrophils improved from 28.3 to 35.6   Nurse Kristin had previously discussed with me that her tremors have improved on primidone. Daughter and patient feel that tremors are not any better.     Can feel dizzy in the am and nighttime when getting OOB to go the bathroom.     Appetite is better. Gets meals from daughter and eats them well.  Denies dysphagia.    No issues sleeping all night.     MOTOR SYMPTOMS      +/-                            Comments  Motor sx overall  Balance and stooped posture are worse   Tremor + Always at rest  Despite constant tremors, she has no issues feeding herself--no action tremors   Rigidity + C/o stiff knees   Bradykinesia     Balance/gait + Worse, shuffling  more  Uses quad cane.    FOG     Falls + New since primidone about 1x/M   PT + On and off at SNF   Exercise         Social  Lives with: SNF  Help with ADLs: assist except can feed herself         [facility] Meds  Med Dose Time   Carbidopa-levodopa 25/100mg 1 tab 3 times a day              Latency unaware    Wearing off none    Side effects     Dyskinesia None    Hallucinations Very little, speaks about a baby    Other None       Prior medications:    Pertinent testing:      Patient Health Questionnaire-2 Score: 0            Current Outpatient Medications:     atorvastatin (Lipitor) 10 mg tablet, , Disp: , Rfl:     carbidopa-levodopa (Sinemet)  mg tablet, Take half tablet three times a day for 3 weeks then take one tablet three times a day, Disp: 120 tablet, Rfl: 3    ergocalciferol (Vitamin D-2) 1.25 MG (19995 UT) capsule, Take 1 capsule (1,250 mcg) by mouth 1 (one) time per week., Disp: , Rfl:     polyethylene glycol (Glycolax, Miralax) 17 gram packet, Take 17 g by mouth once daily as needed., Disp: , Rfl:     propranolol LA (Inderal LA) 80 mg 24 hr capsule, , Disp: , Rfl:     sennosides (senna) 8.6 mg capsule, Take 1 capsule (8.6 mg) by mouth Every night. qM-Th-Sat, Disp: , Rfl:        Objective   Vitals:    03/03/25 1116 03/03/25 1120   BP: 122/69 107/66   BP Location: Left arm Left arm   Patient Position: Sitting Standing   BP Cuff Size: Adult Adult   Pulse: 71 76   Resp: 16    Weight: 51.3 kg (113 lb)                    Physical Exam    MDS UPDRS 1st Score: Motor Examination  Is the patient on medication for treating the symptoms of Parkinson's Disease?: Yes  Patients receiving medication for treating the symptoms of Parkinson's Disease, grover the patient's clinical state.: On  Is the patient on Levodopa?: Yes  Minutes since last Levodopa dose: This am?  Speech: 2  Facial Expression: 2  Rigidty Neck: 0  Rigidty RUE: 2  Rigidity - LUE: 2  Rigidity RLE: 0  Rigidity LLE: 0  Finger Tapping Right  Hand: 1  Finger Tapping Left Hand: 1  Hand Movements- Right Hand: 1  Hand Movements- Left Hand: 2  Pronatiaon-Supination Movments - Right Hand: 1  Pronatiaon-Supination Movments Left Hand: 2  Toe Tapping Right Foot: 3  Toe Tapping - Left Foot: 3  Leg Agility - Right Le  Leg Agility - Left leg: 3  Arising from Chair: 2  Gait: 3  Freezing of Gait: 0  Postural Stability: 0 (not tested)  Posture: 3  Global Spontanteity of Movment ( Body Bradykinesia): 2  Postural Tremor - Right Hand: 0  Postural Tremor - Left hand: 0  Kinetic Tremor - Right hand: 0  Kinetic Tremor - Left hand: 0  Rest Tremor Amplitude - RUE: 2  Rest Tremor Amplitude - LUE: 2  Rest Tremor Amplitude - RLE: 1  Rest Tremor Amplitude - LLE: 2  Rest Tremor Amplitude - Lip/Jaw: 2  Constancy of Rest Tremor: 4  MDS UPDRS Total Score: 50  Were dyskinesias (chorea or dystonia) present during examination?: No        Assessment/Plan   Ms. Ofelia Cabral is a 89 y.o. F with dementia, seen in follow up for parkinsonism and tremors. Tremors started 10 yrs ago, previously tried on sinemet but d/c due to severe hallucinations and though currently on low dose restarted last visit she is having benefit, daughter declines to increase further. Last visit Dr. Dietrich recommended trial of primidone. At [facility] today, has not helped tremor, has started falling which is new and is always tired, will d/c as at 25mg TID not helping and increasing may worsen SE. Her tremor is mainly at rest and no issues with action tremor. Hallucinations are mild.  Sx are suggestive of PDD vs Dementia with Lewy body.    Diagnoses and all orders for this visit:  Parkinson's disease without dyskinesia or fluctuating manifestations  Hallucinations      #Continue carbidopa-levodopa 25/100mg 1 tab 3 times a day    Though helping tremors, will not increase due to worsening hallucinations you had in the past    #Decrease primidone as follows:     Since not helpful and has had some falls, already sedated  it may worsen these issues so will wean off.     Week 1: primidone 50mg 0.5tab 2 times a day  Week 2: primidone 502mg 0.5tab daily then stop    #Follow up in 6M with Dr. Shaikh Ric Campoverde, NP-C  Adult/Gerontological Nurse Practitioner   [facility], Department of Neurology  [facility] [place]  [facility]  [address] [id number]  Phone: [phone]  Fax: [phone]

## 2025-03-05 ENCOUNTER — NURSING HOME VISIT (OUTPATIENT)
Dept: POST ACUTE CARE | Facility: EXTERNAL LOCATION | Age: OVER 89
End: 2025-03-05
Payer: COMMERCIAL

## 2025-03-05 DIAGNOSIS — F01.A18 MILD VASCULAR DEMENTIA WITH OTHER BEHAVIORAL DISTURBANCE: ICD-10-CM

## 2025-03-05 DIAGNOSIS — I10 PRIMARY HYPERTENSION: ICD-10-CM

## 2025-03-05 DIAGNOSIS — G20.A1 PARKINSON'S DISEASE WITHOUT DYSKINESIA OR FLUCTUATING MANIFESTATIONS: Primary | ICD-10-CM

## 2025-03-05 DIAGNOSIS — E78.49 OTHER HYPERLIPIDEMIA: ICD-10-CM

## 2025-03-05 PROCEDURE — 99308 SBSQ NF CARE LOW MDM 20: CPT | Performed by: INTERNAL MEDICINE

## 2025-03-05 NOTE — LETTER
Patient: Ofelia Cabral  : 12/10/1934    Encounter Date: 2025    Subjective- resident seen for follow up. She is sitting in her recliner in her room. Denies any pain. no fall. Appetite is good.  Review of systems-  General-weakness+  Chest-No pain or palpitations  Respiratory-no cough  Abdomen-no pain, no nausea no vomiting no diarrhea  Extremities-no swelling  Psych-confusion present  Neurology-tremors present  Vital signs-blood pressure 142/62  Temperature 97.9  Pulse-71  Weight-116 pounds  Physical exam-  General-no acute distress, pleasant, confused  Neck-no mass or JVD  Chest- CTA B/L  CVS- S1S2 RRR  Abdomen-soft, NT,BS+  Extremities-no edema  Neurology-no gross motor deficits  Resting hand tremors- none today  Assessment and plan-  Dementia-  c/w supportive care  Continue with Seroquel for behaviors  Parkinson's disease- c/w carbidopa/levodopa, primidone  Neuro appt 3/3  Hypertension-stable  Continue with propranolol  Hyperlipidemia - continue atorvastatin  Weakness-c/wsupportive care      Electronically Signed By: Monie Clark MD   3/6/25  2:52 PM

## 2025-03-06 NOTE — PROGRESS NOTES
Subjective- resident seen for follow up. She is sitting in her recliner in her room. Denies any pain. no fall. Appetite is good.  Review of systems-  General-weakness+  Chest-No pain or palpitations  Respiratory-no cough  Abdomen-no pain, no nausea no vomiting no diarrhea  Extremities-no swelling  Psych-confusion present  Neurology-tremors present  Vital signs-blood pressure 142/62  Temperature 97.9  Pulse-71  Weight-116 pounds  Physical exam-  General-no acute distress, pleasant, confused  Neck-no mass or JVD  Chest- CTA B/L  CVS- S1S2 RRR  Abdomen-soft, NT,BS+  Extremities-no edema  Neurology-no gross motor deficits  Resting hand tremors- none today  Assessment and plan-  Dementia-  c/w supportive care  Continue with Seroquel for behaviors  Parkinson's disease- c/w carbidopa/levodopa, primidone  Neuro appt 3/3  Hypertension-stable  Continue with propranolol  Hyperlipidemia - continue atorvastatin  Weakness-c/wsupportive care

## 2025-05-07 ENCOUNTER — NURSING HOME VISIT (OUTPATIENT)
Dept: POST ACUTE CARE | Facility: EXTERNAL LOCATION | Age: OVER 89
End: 2025-05-07
Payer: COMMERCIAL

## 2025-05-07 DIAGNOSIS — F01.A18 MILD VASCULAR DEMENTIA WITH OTHER BEHAVIORAL DISTURBANCE: ICD-10-CM

## 2025-05-07 DIAGNOSIS — R53.1 WEAKNESS: ICD-10-CM

## 2025-05-07 DIAGNOSIS — G20.A1 PARKINSON'S DISEASE WITHOUT DYSKINESIA OR FLUCTUATING MANIFESTATIONS: Primary | ICD-10-CM

## 2025-05-07 DIAGNOSIS — I10 PRIMARY HYPERTENSION: ICD-10-CM

## 2025-05-07 PROCEDURE — 99308 SBSQ NF CARE LOW MDM 20: CPT | Performed by: INTERNAL MEDICINE

## 2025-05-07 NOTE — LETTER
Patient: Ofelia Cabral  : 12/10/1934    Encounter Date: 2025    Subjective- resident seen for follow up. She is sitting in her recliner in her room. Denies any pain. no fall. Appetite is good. She had episodeof mild agiation which resolved. Labs were ordered- K - low, was replaced.  Appears at her baseline today.  Review of systems-  General-weakness+  Chest-No pain or palpitations  Respiratory-no cough  Abdomen-no pain, no nausea no vomiting no diarrhea  Extremities-no swelling  Psych-confusion present  Neurology-tremors present  Vital signs-blood pressure 142/62  Temperature 97.9  Pulse-71  Weight-114.9 pounds  Physical exam-  General-no acute distress, pleasant, confused  Neck-no mass or JVD  Chest- CTAB/L  CVS- S1S2 RRR  Abdomen-soft, NT,BS+  Extremities-no edema  Neurology-no gross motor deficits  Resting hand tremors-none today  Assessment and plan-  Dementia-  c/w supportive care  Continue with Seroquel for behaviors  Parkinson's disease with tremors - c/w carbidopa/levodopa, primidone, propranolol  Hypertension-stable  Continue with propranolol  Hyperlipidemia - continue atorvastatin  Weakness-c/w supportive care  Labs reviewed    Electronically Signed By: Monie Clark MD   25  8:00 PM

## 2025-05-07 NOTE — PROGRESS NOTES
Subjective- resident seen for follow up. She is sitting in her recliner in her room. Denies any pain. no fall. Appetite is good. She had episodeof mild agiation which resolved. Labs were ordered- K - low, was replaced.  Appears at her baseline today.  Review of systems-  General-weakness+  Chest-No pain or palpitations  Respiratory-no cough  Abdomen-no pain, no nausea no vomiting no diarrhea  Extremities-no swelling  Psych-confusion present  Neurology-tremors present  Vital signs-blood pressure 142/62  Temperature 97.9  Pulse-71  Weight-114.9 pounds  Physical exam-  General-no acute distress, pleasant, confused  Neck-no mass or JVD  Chest- CTAB/L  CVS- S1S2 RRR  Abdomen-soft, NT,BS+  Extremities-no edema  Neurology-no gross motor deficits  Resting hand tremors-none today  Assessment and plan-  Dementia-  c/w supportive care  Continue with Seroquel for behaviors  Parkinson's disease with tremors - c/w carbidopa/levodopa, primidone, propranolol  Hypertension-stable  Continue with propranolol  Hyperlipidemia - continue atorvastatin  Weakness-c/w supportive care  Labs reviewed

## 2025-05-22 ENCOUNTER — HOSPITAL ENCOUNTER (OUTPATIENT)
Dept: RADIOLOGY | Facility: HOSPITAL | Age: OVER 89
Discharge: HOME | End: 2025-05-22
Payer: COMMERCIAL

## 2025-05-22 ENCOUNTER — OFFICE VISIT (OUTPATIENT)
Dept: ORTHOPEDIC SURGERY | Facility: HOSPITAL | Age: OVER 89
End: 2025-05-22
Payer: COMMERCIAL

## 2025-05-22 DIAGNOSIS — S49.90XA RECENT SHOULDER INJURY: ICD-10-CM

## 2025-05-22 PROCEDURE — 73030 X-RAY EXAM OF SHOULDER: CPT | Mod: RT

## 2025-05-22 PROCEDURE — 1159F MED LIST DOCD IN RCRD: CPT | Performed by: ORTHOPAEDIC SURGERY

## 2025-05-22 PROCEDURE — 73030 X-RAY EXAM OF SHOULDER: CPT | Mod: LT

## 2025-05-22 PROCEDURE — 73030 X-RAY EXAM OF SHOULDER: CPT | Mod: LEFT SIDE | Performed by: RADIOLOGY

## 2025-05-22 PROCEDURE — 99204 OFFICE O/P NEW MOD 45 MIN: CPT | Performed by: ORTHOPAEDIC SURGERY

## 2025-05-22 PROCEDURE — 99214 OFFICE O/P EST MOD 30 MIN: CPT | Performed by: ORTHOPAEDIC SURGERY

## 2025-05-22 PROCEDURE — 73030 X-RAY EXAM OF SHOULDER: CPT | Mod: RIGHT SIDE | Performed by: RADIOLOGY

## 2025-05-22 NOTE — PROGRESS NOTES
Ofelia Cabral presents concern for fracture of the right shoulder.  Or shoulder pain..  She came to the facility and has Parkinson's with dementia.  She is unsure which shoulder was giving her pain.  Today in the office she is not having any shoulder pain.    The patients full medical history, surgical history, medications, allergies, family, medical history, social history, and a complete 14 point review of systems is documented in the medical record on the signed, scanned medical intake sheet or reviewed in the history of present illness. Review of systems otherwise negative    Medical History[1]    Medication Documentation Review Audit       Reviewed by KEE Kirby-CNP (Nurse Practitioner) on 03/03/25 at 2223      Medication Order Taking? Sig Documenting Provider Last Dose Status   atorvastatin (Lipitor) 10 mg tablet [id number]   Historical Provider, MD  Active   carbidopa-levodopa (Sinemet)  mg tablet [id number]  Take half tablet three times a day for 3 weeks then take one tablet three times a day Marycarmen Ayala MD  Active   ergocalciferol (Vitamin D-2) 1.25 MG (78330 UT) capsule [id number]  Take 1 capsule (1,250 mcg) by mouth 1 (one) time per week. Historical Provider, MD  Active   polyethylene glycol (Glycolax, Miralax) 17 gram packet [id number]  Take 17 g by mouth once daily as needed. Historical Provider, MD  Active   propranolol LA (Inderal LA) 80 mg 24 hr capsule [id number]   Historical Provider, MD  Active   sennosides (senna) 8.6 mg capsule [id number]  Take 1 capsule (8.6 mg) by mouth Every night. qM-Th-Sat Historical Provider, MD  Active                    RX Allergies[2]    Social History     Socioeconomic History    Marital status:      Spouse name: Not on file    Number of children: Not on file    Years of education: Not on file    Highest education level: Not on file   Occupational History    Not on file   Tobacco Use    Smoking status: Unknown    Smokeless tobacco:  Not on file   Substance and Sexual Activity    Alcohol use: Not Currently    Drug use: Never    Sexual activity: Not on file   Other Topics Concern    Not on file   Social History Narrative    Not on file     Social Drivers of Health     Financial Resource Strain: Not on file   Food Insecurity: Not on file   Transportation Needs: Not on file   Physical Activity: Not on file   Stress: Not on file   Social Connections: Not on file   Intimate Partner Violence: Not on file   Housing Stability: Not on file       Surgical History[3]    Gen: The patient is alert and oriented ×3, is in no acute distress, and appear their stated age and weight.    Psychiatric: Mood and affect are appropriate.    Eyes: Sclera are white, and pupils are round and symmetric.    ENT: Mucous membranes are moist.     Neck: Supple. Thyroid is midline.    Respiratory: Respirations are nonlabored, chest rise is symmetric.    Cardiac: Rate is regular by palpation of distal pulses.     Abdomen: Nondistended.    Integument: No obvious cutaneous lesions are noted. No signs of lymphangitis. No signs of systemic edema.  side: bilateral upper extremity :  Distally neurovascular exam is stable.  Range of motion is within expected normal limits.  I was able to actively and passively move her shoulder full range of motion without any pain.      I personally reviewed multiple views of right and left shoulder were obtained in the office today demonstrate no fracture dislocation of either shoulder.    Ofelia Cabral is a 89 y.o. female patient status post evaluation for right shoulder fracture   I went over her x-rays in detail today.   On her x-rays demonstrates no fractures or dislocations.  Unfortunately I am she is not a great historian and she did not bring anyone with her to explain her evaluation.  The good news is its both her shoulders look unremarkable.  she is WBAT of the side: bilateral upper extremity. ~He/she~ is range of motion as tolerated of the  side: bilateral upper extremity.  I stressed the importance of physical therapy on overall functional outcome. I answered all patient's questions he agrees with treatment plan.  No follow-up is necessary as she has no fractures or dislocations if she continues left shoulder pain I recommend following with a shoulder specialist.        Navdeep Alvarado    Department of Orthopaedic Trauma Surgery             [1]   Past Medical History:  Diagnosis Date    Personal history of other medical treatment     History of mammogram   [2]   Allergies  Allergen Reactions    Iodinated Contrast Media Unknown    Strawberry Hives   [3] No past surgical history on file.

## 2025-05-27 ENCOUNTER — TELEPHONE (OUTPATIENT)
Dept: ORTHOPEDIC SURGERY | Facility: HOSPITAL | Age: OVER 89
End: 2025-05-27
Payer: COMMERCIAL

## 2025-05-27 NOTE — TELEPHONE ENCOUNTER
Copied from CRM #[id number]. Topic: Complaint - Interaction w/ Provider or Office  >> May 22, 2025  5:09 PM Caleb BARAJAS wrote:  [id number], Ofelia Cabral,    Pts daughter called from [place]. Requesting an immediate call re: her mother.  Mentioned she wasn't happy about interaction betwwen her mother and the office.  Daughter's name: Tiera Misha : [phone].

## 2025-06-04 ENCOUNTER — NURSING HOME VISIT (OUTPATIENT)
Dept: POST ACUTE CARE | Facility: EXTERNAL LOCATION | Age: OVER 89
End: 2025-06-04
Payer: COMMERCIAL

## 2025-06-04 DIAGNOSIS — E78.49 OTHER HYPERLIPIDEMIA: ICD-10-CM

## 2025-06-04 DIAGNOSIS — G20.A1 PARKINSON'S DISEASE WITHOUT DYSKINESIA OR FLUCTUATING MANIFESTATIONS: Primary | ICD-10-CM

## 2025-06-04 DIAGNOSIS — I10 PRIMARY HYPERTENSION: ICD-10-CM

## 2025-06-04 DIAGNOSIS — F01.A18 MILD VASCULAR DEMENTIA WITH OTHER BEHAVIORAL DISTURBANCE: ICD-10-CM

## 2025-06-04 PROCEDURE — 99308 SBSQ NF CARE LOW MDM 20: CPT | Performed by: INTERNAL MEDICINE

## 2025-06-04 NOTE — LETTER
Patient: Ofelia Cabral  : 12/10/1934    Encounter Date: 2025    Subjective- resident seen for follow up. She is sitting in her recliner in her room. Denies any pain. no fall. Appetite is good.  Appears at her baseline today.  Review of systems-  General-weakness+  Chest-No pain or palpitations  Respiratory-no cough  Abdomen-no pain, no nausea no vomiting no diarrhea  Extremities-no swelling  Psych-confusion present  Neurology-tremors present  Vital signs-blood pressure 142/62  Temperature 97.9  Pulse-71  Weight-114.9 pounds  Physical exam-  General-no acute distress, pleasant, confused  Neck-no mass or JVD  Chest- CTAB/L  CVS- S1S2 RRR  Abdomen-soft, NT,BS+  Extremities-no edema  Neurology-no gross motor deficits  Resting hand tremors-none today    Assessment and plan-  Dementia-  c/w supportive care  Continue with Seroquel for behaviors  Parkinson's disease with tremors - c/w carbidopa/levodopa, primidone, propranolol  Hypertension-stable  Continue with propranolol  Hyperlipidemia - continue atorvastatin  Weakness-c/w supportive care    Electronically Signed By: Monie Clark MD   25  8:52 PM

## 2025-06-05 NOTE — PROGRESS NOTES
Subjective- resident seen for follow up. She is sitting in her recliner in her room. Denies any pain. no fall. Appetite is good.  Appears at her baseline today.  Review of systems-  General-weakness+  Chest-No pain or palpitations  Respiratory-no cough  Abdomen-no pain, no nausea no vomiting no diarrhea  Extremities-no swelling  Psych-confusion present  Neurology-tremors present  Vital signs-blood pressure 142/62  Temperature 97.9  Pulse-71  Weight-114.9 pounds  Physical exam-  General-no acute distress, pleasant, confused  Neck-no mass or JVD  Chest- CTAB/L  CVS- S1S2 RRR  Abdomen-soft, NT,BS+  Extremities-no edema  Neurology-no gross motor deficits  Resting hand tremors-none today    Assessment and plan-  Dementia-  c/w supportive care  Continue with Seroquel for behaviors  Parkinson's disease with tremors - c/w carbidopa/levodopa, primidone, propranolol  Hypertension-stable  Continue with propranolol  Hyperlipidemia - continue atorvastatin  Weakness-c/w supportive care

## 2025-07-02 ENCOUNTER — NURSING HOME VISIT (OUTPATIENT)
Dept: POST ACUTE CARE | Facility: EXTERNAL LOCATION | Age: OVER 89
End: 2025-07-02
Payer: COMMERCIAL

## 2025-07-02 DIAGNOSIS — G20.A1 PARKINSON'S DISEASE WITHOUT DYSKINESIA OR FLUCTUATING MANIFESTATIONS: Primary | ICD-10-CM

## 2025-07-02 DIAGNOSIS — F01.A18 MILD VASCULAR DEMENTIA WITH OTHER BEHAVIORAL DISTURBANCE: ICD-10-CM

## 2025-07-02 DIAGNOSIS — E78.49 OTHER HYPERLIPIDEMIA: ICD-10-CM

## 2025-07-02 DIAGNOSIS — R53.1 WEAKNESS: ICD-10-CM

## 2025-07-02 DIAGNOSIS — I10 PRIMARY HYPERTENSION: ICD-10-CM

## 2025-07-02 PROCEDURE — 99308 SBSQ NF CARE LOW MDM 20: CPT | Performed by: INTERNAL MEDICINE

## 2025-07-02 NOTE — LETTER
Patient: Ofelia Cabral  : 12/10/1934    Encounter Date: 2025    Subjective- resident seen for follow up. She is sitting in her recliner in her room. Denies any pain. no fall. Appetite is good.  [facility].  Review of systems-  General-weakness+  Chest-No pain or palpitations  Respiratory-no cough  Abdomen-no pain, no nausea no vomiting no diarrhea  Extremities-no swelling  Psych-confusion present  Neurology-tremors present  Vital signs-blood pressure 142/62  Temperature 97.9  Pulse-71  Weight-111 pounds  Physical exam-  General-no acute distress, pleasant, confused  Neck-no mass or JVD  Chest- CTAB/L  CVS- S1S2 RRR  Abdomen-soft, NT,BS+  Extremities-no edema  Neurology-no gross motor deficits  Resting hand tremors-none today  Assessment and plan-  Dementia-  c/w supportive care  Continue with Seroquel for behaviors  Parkinson's disease with tremors - c/w carbidopa/levodopa, primidone, propranolol  Hypertension-stable  Continue with propranolol  Hyperlipidemia - continue atorvastatin  Weakness-c/w supportive care  ADL's and CCL's have been reviewed and are current as per care plan.      Electronically Signed By: Monie Clark MD   25  8:16 PM

## 2025-07-03 NOTE — PROGRESS NOTES
Subjective- resident seen for follow up. She is sitting in her recliner in her room. Denies any pain. no fall. Appetite is good.  Appears at [facility] today.  Review of systems-  General-weakness+  Chest-No pain or palpitations  Respiratory-no cough  Abdomen-no pain, no nausea no vomiting no diarrhea  Extremities-no swelling  Psych-confusion present  Neurology-tremors present  Vital signs-blood pressure 142/62  Temperature 97.9  Pulse-71  Weight-111 pounds  Physical exam-  General-no acute distress, pleasant, confused  Neck-no mass or JVD  Chest- CTAB/L  CVS- S1S2 RRR  Abdomen-soft, NT,BS+  Extremities-no edema  Neurology-no gross motor deficits  Resting hand tremors-none today  Assessment and plan-  Dementia-  c/w supportive care  Continue with Seroquel for behaviors  Parkinson's disease with tremors - c/w carbidopa/levodopa, primidone, propranolol  Hypertension-stable  Continue with propranolol  Hyperlipidemia - continue atorvastatin  Weakness-c/w supportive care  ADL's and CCL's have been reviewed and are current as per care plan.

## 2025-09-03 ENCOUNTER — NURSING HOME VISIT (OUTPATIENT)
Dept: POST ACUTE CARE | Facility: EXTERNAL LOCATION | Age: OVER 89
End: 2025-09-03
Payer: COMMERCIAL

## 2025-09-03 DIAGNOSIS — R53.1 WEAKNESS: ICD-10-CM

## 2025-09-03 DIAGNOSIS — F01.A18 MILD VASCULAR DEMENTIA WITH OTHER BEHAVIORAL DISTURBANCE: ICD-10-CM

## 2025-09-03 DIAGNOSIS — I10 PRIMARY HYPERTENSION: ICD-10-CM

## 2025-09-03 DIAGNOSIS — E78.49 OTHER HYPERLIPIDEMIA: ICD-10-CM

## 2025-09-03 DIAGNOSIS — G20.A1 PARKINSON'S DISEASE WITHOUT DYSKINESIA OR FLUCTUATING MANIFESTATIONS: Primary | ICD-10-CM

## 2025-09-03 PROCEDURE — 99308 SBSQ NF CARE LOW MDM 20: CPT | Performed by: INTERNAL MEDICINE
